# Patient Record
Sex: FEMALE | Race: OTHER | Employment: STUDENT | URBAN - METROPOLITAN AREA
[De-identification: names, ages, dates, MRNs, and addresses within clinical notes are randomized per-mention and may not be internally consistent; named-entity substitution may affect disease eponyms.]

---
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
## 2021-01-15 ENCOUNTER — TELEPHONE (OUTPATIENT)
Dept: FAMILY MEDICINE CLINIC | Facility: CLINIC | Age: 21
End: 2021-01-15

## 2021-01-15 RX ORDER — LEVONORGESTREL AND ETHINYL ESTRADIOL 0.1-0.02MG
1 KIT ORAL DAILY
Qty: 84 TABLET | Refills: 3 | Status: SHIPPED | OUTPATIENT
Start: 2021-01-15 | End: 2021-07-22

## 2021-01-21 ENCOUNTER — OFFICE VISIT (OUTPATIENT)
Dept: FAMILY MEDICINE CLINIC | Facility: CLINIC | Age: 21
End: 2021-01-21
Payer: COMMERCIAL

## 2021-01-21 VITALS
BODY MASS INDEX: 39.54 KG/M2 | HEART RATE: 98 BPM | RESPIRATION RATE: 14 BRPM | SYSTOLIC BLOOD PRESSURE: 120 MMHG | TEMPERATURE: 97 F | WEIGHT: 249 LBS | DIASTOLIC BLOOD PRESSURE: 70 MMHG | HEIGHT: 66.5 IN | OXYGEN SATURATION: 97 %

## 2021-01-21 DIAGNOSIS — G43.709 CHRONIC MIGRAINE WITHOUT AURA WITHOUT STATUS MIGRAINOSUS, NOT INTRACTABLE: ICD-10-CM

## 2021-01-21 PROCEDURE — 99213 OFFICE O/P EST LOW 20 MIN: CPT | Performed by: FAMILY MEDICINE

## 2021-01-21 PROCEDURE — 3008F BODY MASS INDEX DOCD: CPT | Performed by: FAMILY MEDICINE

## 2021-01-21 PROCEDURE — 3074F SYST BP LT 130 MM HG: CPT | Performed by: FAMILY MEDICINE

## 2021-01-21 PROCEDURE — 3078F DIAST BP <80 MM HG: CPT | Performed by: FAMILY MEDICINE

## 2021-01-21 RX ORDER — AMITRIPTYLINE HYDROCHLORIDE 25 MG/1
25 TABLET, FILM COATED ORAL NIGHTLY
Qty: 90 TABLET | Refills: 1 | Status: SHIPPED | OUTPATIENT
Start: 2021-01-21 | End: 2021-07-22

## 2021-01-21 RX ORDER — RIZATRIPTAN BENZOATE 10 MG/1
10 TABLET, ORALLY DISINTEGRATING ORAL AS NEEDED
Qty: 12 TABLET | Refills: 3 | Status: SHIPPED | OUTPATIENT
Start: 2021-01-21 | End: 2021-07-22

## 2021-01-21 NOTE — PROGRESS NOTES
Chief Complaint:  Patient presents with:  Headache: Follow Up on medication, notice relief     HPI:  This is a 24year old female patient presenting for Headache (Follow Up on medication, notice relief )    Migraines: Stopped taking topamax last visit.  Sta Alcohol use: No    Drug use: No       Current Outpatient Medications   Medication Sig Dispense Refill   • Amitriptyline HCl 25 MG Oral Tab Take 1 tablet (25 mg total) by mouth nightly.  90 tablet 1   • Rizatriptan Benzoate 10 MG Oral Tablet Dispersible Nathen Osier Dispersible          Advised the following:  Mita Yepez was seen today for headache. Diagnoses and all orders for this visit:    Chronic migraine without aura without status migrainosus, not intractable  Given improvement, will continue current management.

## 2021-03-03 ENCOUNTER — HOSPITAL ENCOUNTER (OUTPATIENT)
Dept: GENERAL RADIOLOGY | Age: 21
Discharge: HOME | End: 2021-03-03
Attending: NURSE PRACTITIONER
Payer: COMMERCIAL

## 2021-03-03 ENCOUNTER — OFFICE VISIT (OUTPATIENT)
Dept: FAMILY MEDICINE CLINIC | Facility: CLINIC | Age: 21
End: 2021-03-03
Payer: COMMERCIAL

## 2021-03-03 VITALS
RESPIRATION RATE: 16 BRPM | HEIGHT: 68.5 IN | DIASTOLIC BLOOD PRESSURE: 80 MMHG | TEMPERATURE: 98 F | SYSTOLIC BLOOD PRESSURE: 118 MMHG | WEIGHT: 252 LBS | HEART RATE: 90 BPM | BODY MASS INDEX: 37.76 KG/M2

## 2021-03-03 DIAGNOSIS — S69.92XA INJURY OF FINGER OF LEFT HAND, INITIAL ENCOUNTER: ICD-10-CM

## 2021-03-03 DIAGNOSIS — M79.645 PAIN OF FINGER OF LEFT HAND: Primary | ICD-10-CM

## 2021-03-03 DIAGNOSIS — M79.645 PAIN OF FINGER OF LEFT HAND: ICD-10-CM

## 2021-03-03 PROCEDURE — 3074F SYST BP LT 130 MM HG: CPT | Performed by: NURSE PRACTITIONER

## 2021-03-03 PROCEDURE — 73140 X-RAY EXAM OF FINGER(S): CPT | Performed by: NURSE PRACTITIONER

## 2021-03-03 PROCEDURE — 3079F DIAST BP 80-89 MM HG: CPT | Performed by: NURSE PRACTITIONER

## 2021-03-03 PROCEDURE — 99213 OFFICE O/P EST LOW 20 MIN: CPT | Performed by: NURSE PRACTITIONER

## 2021-03-03 PROCEDURE — 3008F BODY MASS INDEX DOCD: CPT | Performed by: NURSE PRACTITIONER

## 2021-03-03 RX ORDER — NAPROXEN 500 MG/1
500 TABLET ORAL 2 TIMES DAILY WITH MEALS
Qty: 10 TABLET | Refills: 0 | Status: SHIPPED | OUTPATIENT
Start: 2021-03-03

## 2021-03-03 NOTE — PROGRESS NOTES
Patient presents with:  Finger Pain: middle finger on left hand       HPI:  Presents with approx 17 day history of left hand 3rd digit pain after she accidentally closed her finger in a car trunk. Stated at time of injury fingertip bled \"a lot\".  Since th before exercise. 1 Inhaler 6   • Fluticasone Propionate 50 MCG/ACT Nasal Suspension 1 spray by Each Nare route as needed for Rhinitis.  3 Bottle 3       Physical Exam  /80   Pulse 90   Temp 97.7 °F (36.5 °C) (Temporal)   Resp 16   Ht 5' 8.5\" (1.74 m) visit. All questions were answered and the patient understands the plan.

## 2021-07-22 NOTE — PROGRESS NOTES
Chief Complaint:  Patient presents with:  Medication Request: 6 month Follow Up     HPI:  This is a 22year old female patient presenting for Medication Request (6 month Follow Up )    Anxiety: Patient has anxiety symptoms are significantly worsened recent Past Surgical History:   Procedure Laterality Date   • OTHER Bilateral 2010    ear surgery to remove earring   • OTHER SURGICAL HISTORY  01/27/2020    excision of inflamed sebaceous cyst      Family History   Problem Relation Age of Onset   • Other (mult 07/22/21  1316   BP: 118/70   Pulse: 80   Resp: 16   Temp: 98.7 °F (37.1 °C)   TempSrc: Temporal   Weight: 249 lb (112.9 kg)   Height: 5' 6.5\" (1.689 m)     GENERAL: vitals reviewed and listed above, alert, oriented, appears well hydrated and in no acute counseling. Side effects discussed in detail.  -     desvenlafaxine succinate ER 25 MG Oral Tablet 24 Hr;  Take 1 tablet (25 mg total) by mouth daily.  -     OP REFERRAL TO [facility] CH[facility]    Chronic migraine without aura without status migrainosus, not intractab

## 2021-10-15 DIAGNOSIS — R05.9 COUGH: ICD-10-CM

## 2021-10-15 DIAGNOSIS — R06.00 DYSPNEA ON EXERTION: ICD-10-CM

## 2021-10-15 DIAGNOSIS — G43.709 CHRONIC MIGRAINE WITHOUT AURA WITHOUT STATUS MIGRAINOSUS, NOT INTRACTABLE: ICD-10-CM

## 2021-10-15 RX ORDER — RIZATRIPTAN BENZOATE 10 MG/1
10 TABLET, ORALLY DISINTEGRATING ORAL AS NEEDED
Qty: 12 TABLET | Refills: 3 | Status: CANCELLED | OUTPATIENT
Start: 2021-10-15

## 2021-10-15 RX ORDER — ALBUTEROL SULFATE 90 UG/1
AEROSOL, METERED RESPIRATORY (INHALATION)
Qty: 18 G | Refills: 1 | Status: CANCELLED | OUTPATIENT
Start: 2021-10-15

## 2021-10-15 RX ORDER — AMITRIPTYLINE HYDROCHLORIDE 25 MG/1
25 TABLET, FILM COATED ORAL NIGHTLY
Qty: 90 TABLET | Refills: 1 | Status: CANCELLED | OUTPATIENT
Start: 2021-10-15

## 2021-10-15 RX ORDER — AMITRIPTYLINE HYDROCHLORIDE 10 MG/1
10 TABLET, FILM COATED ORAL NIGHTLY
Qty: 90 TABLET | Refills: 0 | Status: CANCELLED | OUTPATIENT
Start: 2021-10-15

## 2021-10-26 ENCOUNTER — TELEPHONE (OUTPATIENT)
Dept: FAMILY MEDICINE CLINIC | Facility: CLINIC | Age: 21
End: 2021-10-26

## 2021-10-26 DIAGNOSIS — H57.89 EYE IRRITATION: Primary | ICD-10-CM

## 2021-10-26 NOTE — TELEPHONE ENCOUNTER
Called and patient she has tried OTC meds eye drops and pills denies drainage. Eye lids get itchy red and puffy this happens for several days then when she wakes up the swelling is gone her eye lids have peeling skin.  I will talk to Dr Sherif Fabian and see if sh

## 2021-10-26 NOTE — TELEPHONE ENCOUNTER
Pt calling she has been having issues with her eye it gets scratchy swollen and it hurts off and on since August.  She is wanted a appt or to find out what to do.

## 2021-10-27 NOTE — TELEPHONE ENCOUNTER
Suspect lid eczema. Yes, let's refer to ophthalmology. Please inform of referral info.    [place] Roxana, DO

## 2021-10-27 NOTE — TELEPHONE ENCOUNTER
LMOM for Sadia, letting her know a referral has been place to ophthalmology. Asked to return a call to triage to give her the contact information.

## 2021-12-06 ENCOUNTER — LAB ENCOUNTER (OUTPATIENT)
Dept: LAB | Facility: HOSPITAL | Age: 21
End: 2021-12-06
Attending: FAMILY MEDICINE
Payer: COMMERCIAL

## 2021-12-06 DIAGNOSIS — Z00.00 WELL WOMAN EXAM WITHOUT GYNECOLOGICAL EXAM: ICD-10-CM

## 2021-12-06 DIAGNOSIS — E55.9 VITAMIN D DEFICIENCY: Primary | ICD-10-CM

## 2021-12-06 PROCEDURE — 84443 ASSAY THYROID STIM HORMONE: CPT

## 2021-12-06 PROCEDURE — 82306 VITAMIN D 25 HYDROXY: CPT

## 2021-12-06 PROCEDURE — 80053 COMPREHEN METABOLIC PANEL: CPT

## 2021-12-06 PROCEDURE — 80061 LIPID PANEL: CPT

## 2021-12-06 PROCEDURE — 85027 COMPLETE CBC AUTOMATED: CPT

## 2021-12-06 PROCEDURE — 36415 COLL VENOUS BLD VENIPUNCTURE: CPT

## 2021-12-06 RX ORDER — ERGOCALCIFEROL 1.25 MG/1
50000 CAPSULE ORAL WEEKLY
Qty: 12 CAPSULE | Refills: 0 | Status: SHIPPED | OUTPATIENT
Start: 2021-12-06 | End: 2022-01-10

## 2022-01-10 NOTE — PROGRESS NOTES
Chief Complaint:  Patient presents with:  Asthma: ACT due, albuterol refill  Headache: Amitriptyline refill    HPI:  This is a 22year old female patient presenting for Asthma (ACT due, albuterol refill) and Headache (Amitriptyline refill)    Concerns/upda Hx of breast cancer, Hx of cirrhosis, liver lesion and SLE (antiphospholipid antibody positive)    Health Maintenance:  COVID-19 Vaccine(1) Never done  Chlamydia Screening due on 10/04/2020  Annual Depression Screen due on 11/06/2020  Influenza Vaccine(1) albuterol (PROAIR HFA) 108 (90 Base) MCG/ACT Inhalation Aero Soln Use 1-2 puffs every 4-6 hours or before exercise. 18 g 1   • amitriptyline 10 MG Oral Tab Take 1 tablet (10 mg total) by mouth nightly.  Take in addition to 25mg tablet 90 tablet 1   • Levono MG Oral Tab    rizatriptan 10 MG Oral Tablet Dispersible    amitriptyline 10 MG Oral Tab      Other Visit Diagnoses     Vitamin D deficiency        Relevant Medications    ergocalciferol 1.25 MG (43173 UT) Oral Cap    Cough        Relevant Medications    a anxiety medication.     RTC in 6 months for Min Stephen DO  1/10/2022 3:27 PM  Family Medicine

## 2022-01-21 ENCOUNTER — TELEPHONE (OUTPATIENT)
Dept: FAMILY MEDICINE CLINIC | Facility: CLINIC | Age: 22
End: 2022-01-21

## 2022-01-21 DIAGNOSIS — H02.849 EYELID GLAND SWELLING, UNSPECIFIED LATERALITY: Primary | ICD-10-CM

## 2022-01-21 DIAGNOSIS — J45.20 MILD INTERMITTENT ASTHMA WITHOUT COMPLICATION: ICD-10-CM

## 2022-01-21 DIAGNOSIS — R06.00 DYSPNEA ON EXERTION: ICD-10-CM

## 2022-01-21 NOTE — TELEPHONE ENCOUNTER
From: Charity Lucas  To: Claudean Joiner, DO  Sent: 1/21/2022 2:07 PM CST  Subject: Rosibel Buchanan,  I finally got to getting my prescriptions from [address] [place] [address], and I noticed Pristiq is no longer on my medication list for anxiety.  I don't

## 2022-01-21 NOTE — TELEPHONE ENCOUNTER
Referral request Dr. Lore Ramires M.D. Eyelid swelling  Asthma  Dyspnea on exertion    Office notes from Dr. Alla Flores, DO 1/10/22  -Late summer into fall patient developed eyelid swelling, eye redness and pruritus. Had no clear allergic trigger.   It is ha

## 2022-01-21 NOTE — TELEPHONE ENCOUNTER
Pt is calling requesting a referral for allergist. Pt had visit with Dr. Danisha Diaz 01/10/22 and discussed allergies with Dr. Fidencio Nagy pt has ongoing swelling of eyes from allergies. Please contact patient once referral entered.  Thanks

## 2022-03-09 ENCOUNTER — TELEPHONE (OUTPATIENT)
Dept: FAMILY MEDICINE CLINIC | Facility: CLINIC | Age: 22
End: 2022-03-09

## 2022-03-09 NOTE — TELEPHONE ENCOUNTER
Pt requesting a note for her School as she has been having attendance issues due to her migraines. One of her professor's has brought this up to Administration.  Pt will  when ready

## 2022-03-09 NOTE — TELEPHONE ENCOUNTER
Spoke with patient. She reports worsening of her migraines the past 3-4 weeks. Has been missing 3 days of class per week. Would previously have relief after one tablet of rizatriptan but now she is needing the 2nd tablet and it takes 4-5 hours for relief. The only thing that has been helping is being in a quiet dark room or taking a nap. Professor at school has brought it up to [place]. She would like a letter on file that states her diagnosis and also excuse her from the days she has already missed. Patient is scheduled on 3/22 with Dr. Gulshan Dela Cruz. Would like ASAP if able. Please advise. Patient is not symptomatic now.

## 2022-03-17 ENCOUNTER — TELEMEDICINE (OUTPATIENT)
Dept: FAMILY MEDICINE CLINIC | Facility: CLINIC | Age: 22
End: 2022-03-17

## 2022-03-17 DIAGNOSIS — G43.709 CHRONIC MIGRAINE WITHOUT AURA WITHOUT STATUS MIGRAINOSUS, NOT INTRACTABLE: ICD-10-CM

## 2022-03-17 PROCEDURE — 99214 OFFICE O/P EST MOD 30 MIN: CPT | Performed by: FAMILY MEDICINE

## 2022-03-17 RX ORDER — AMITRIPTYLINE HYDROCHLORIDE 50 MG/1
50 TABLET, FILM COATED ORAL NIGHTLY
Qty: 90 TABLET | Refills: 0 | Status: SHIPPED | OUTPATIENT
Start: 2022-03-17

## 2022-03-17 RX ORDER — BUTALBITAL, ACETAMINOPHEN AND CAFFEINE 300; 40; 50 MG/1; MG/1; MG/1
1 CAPSULE ORAL EVERY 6 HOURS PRN
Qty: 30 CAPSULE | Refills: 1 | Status: SHIPPED | OUTPATIENT
Start: 2022-03-17

## 2022-03-21 RX ORDER — LEVONORGESTREL AND ETHINYL ESTRADIOL 0.1-0.02MG
1 KIT ORAL DAILY
Qty: 84 TABLET | Refills: 5 | Status: SHIPPED | OUTPATIENT
Start: 2022-03-21

## 2022-05-07 ENCOUNTER — HOSPITAL ENCOUNTER (OUTPATIENT)
Age: 22
Discharge: HOME | End: 2022-05-07
Payer: COMMERCIAL

## 2022-05-07 ENCOUNTER — APPOINTMENT (OUTPATIENT)
Dept: GENERAL RADIOLOGY | Age: 22
End: 2022-05-07
Attending: PHYSICIAN ASSISTANT
Payer: COMMERCIAL

## 2022-05-07 VITALS
SYSTOLIC BLOOD PRESSURE: 129 MMHG | TEMPERATURE: 98 F | OXYGEN SATURATION: 98 % | DIASTOLIC BLOOD PRESSURE: 85 MMHG | RESPIRATION RATE: 20 BRPM | HEART RATE: 98 BPM

## 2022-05-07 DIAGNOSIS — M25.571 ACUTE RIGHT ANKLE PAIN: Primary | ICD-10-CM

## 2022-05-07 DIAGNOSIS — M79.671 RIGHT FOOT PAIN: ICD-10-CM

## 2022-05-07 PROCEDURE — 73630 X-RAY EXAM OF FOOT: CPT | Performed by: PHYSICIAN ASSISTANT

## 2022-05-07 PROCEDURE — 73610 X-RAY EXAM OF ANKLE: CPT | Performed by: PHYSICIAN ASSISTANT

## 2022-05-07 PROCEDURE — E0114 CRUTCH UNDERARM PAIR NO WOOD: HCPCS | Performed by: PHYSICIAN ASSISTANT

## 2022-05-07 PROCEDURE — 99213 OFFICE O/P EST LOW 20 MIN: CPT | Performed by: PHYSICIAN ASSISTANT

## 2022-05-07 NOTE — ED INITIAL ASSESSMENT (HPI)
Pt missed the last stair last night and twisted her rt ankle and foot.   Remains painful and swollen

## 2022-05-10 ENCOUNTER — OFFICE VISIT (OUTPATIENT)
Dept: FAMILY MEDICINE CLINIC | Facility: CLINIC | Age: 22
End: 2022-05-10
Payer: COMMERCIAL

## 2022-05-10 VITALS
OXYGEN SATURATION: 99 % | WEIGHT: 252 LBS | BODY MASS INDEX: 39.55 KG/M2 | DIASTOLIC BLOOD PRESSURE: 80 MMHG | HEIGHT: 67 IN | HEART RATE: 96 BPM | SYSTOLIC BLOOD PRESSURE: 120 MMHG | TEMPERATURE: 98 F | RESPIRATION RATE: 16 BRPM

## 2022-05-10 DIAGNOSIS — S93.401A SPRAIN OF RIGHT ANKLE, UNSPECIFIED LIGAMENT, INITIAL ENCOUNTER: Primary | ICD-10-CM

## 2022-05-10 PROCEDURE — 3079F DIAST BP 80-89 MM HG: CPT | Performed by: NURSE PRACTITIONER

## 2022-05-10 PROCEDURE — 3074F SYST BP LT 130 MM HG: CPT | Performed by: NURSE PRACTITIONER

## 2022-05-10 PROCEDURE — 99213 OFFICE O/P EST LOW 20 MIN: CPT | Performed by: NURSE PRACTITIONER

## 2022-05-10 PROCEDURE — 3008F BODY MASS INDEX DOCD: CPT | Performed by: NURSE PRACTITIONER

## 2022-05-10 RX ORDER — NAPROXEN 500 MG/1
500 TABLET ORAL 2 TIMES DAILY WITH MEALS
Qty: 10 TABLET | Refills: 0 | Status: SHIPPED | OUTPATIENT
Start: 2022-05-10

## 2022-05-31 DIAGNOSIS — E55.9 VITAMIN D DEFICIENCY: ICD-10-CM

## 2022-05-31 DIAGNOSIS — G43.709 CHRONIC MIGRAINE WITHOUT AURA WITHOUT STATUS MIGRAINOSUS, NOT INTRACTABLE: ICD-10-CM

## 2022-05-31 DIAGNOSIS — Z30.41 ENCOUNTER FOR SURVEILLANCE OF CONTRACEPTIVE PILLS: ICD-10-CM

## 2022-05-31 NOTE — TELEPHONE ENCOUNTER
Pt calling to ask if refills can be sent over today as her insurance expires today and would like to get one more round of refills before then.  Please send to [facility] on [address]

## 2022-05-31 NOTE — TELEPHONE ENCOUNTER
LOV 05/10/22 sprain ankle Devaughn  previous OV 01/10/22 w/ Elsy  Past due for physical & chlamydia screen  Physical scheduled 7/11/22

## 2022-06-01 RX ORDER — LEVONORGESTREL AND ETHINYL ESTRADIOL 0.1-0.02MG
1 KIT ORAL DAILY
Qty: 84 TABLET | Refills: 5 | Status: SHIPPED | OUTPATIENT
Start: 2022-06-01

## 2022-06-01 RX ORDER — ERGOCALCIFEROL 1.25 MG/1
50000 CAPSULE ORAL WEEKLY
Qty: 12 CAPSULE | Refills: 0 | Status: SHIPPED | OUTPATIENT
Start: 2022-06-01

## 2022-06-01 RX ORDER — BUTALBITAL, ACETAMINOPHEN AND CAFFEINE 300; 40; 50 MG/1; MG/1; MG/1
1 CAPSULE ORAL EVERY 6 HOURS PRN
Qty: 30 CAPSULE | Refills: 1 | Status: SHIPPED | OUTPATIENT
Start: 2022-06-01

## 2022-06-01 RX ORDER — AMITRIPTYLINE HYDROCHLORIDE 50 MG/1
50 TABLET, FILM COATED ORAL NIGHTLY
Qty: 90 TABLET | Refills: 0 | Status: SHIPPED | OUTPATIENT
Start: 2022-06-01

## 2022-08-04 ENCOUNTER — OFFICE VISIT (OUTPATIENT)
Dept: FAMILY MEDICINE CLINIC | Facility: CLINIC | Age: 22
End: 2022-08-04
Payer: COMMERCIAL

## 2022-08-04 VITALS
HEIGHT: 67 IN | WEIGHT: 251 LBS | HEART RATE: 80 BPM | TEMPERATURE: 98 F | RESPIRATION RATE: 16 BRPM | SYSTOLIC BLOOD PRESSURE: 118 MMHG | BODY MASS INDEX: 39.39 KG/M2 | DIASTOLIC BLOOD PRESSURE: 70 MMHG

## 2022-08-04 DIAGNOSIS — R11.0 NAUSEA: ICD-10-CM

## 2022-08-04 DIAGNOSIS — G43.709 CHRONIC MIGRAINE WITHOUT AURA WITHOUT STATUS MIGRAINOSUS, NOT INTRACTABLE: Primary | ICD-10-CM

## 2022-08-04 DIAGNOSIS — R10.84 GENERALIZED POSTPRANDIAL ABDOMINAL PAIN: ICD-10-CM

## 2022-08-04 PROCEDURE — 3008F BODY MASS INDEX DOCD: CPT | Performed by: FAMILY MEDICINE

## 2022-08-04 PROCEDURE — 3078F DIAST BP <80 MM HG: CPT | Performed by: FAMILY MEDICINE

## 2022-08-04 PROCEDURE — 3074F SYST BP LT 130 MM HG: CPT | Performed by: FAMILY MEDICINE

## 2022-08-04 PROCEDURE — 99214 OFFICE O/P EST MOD 30 MIN: CPT | Performed by: FAMILY MEDICINE

## 2022-08-04 RX ORDER — BUTALBITAL, ACETAMINOPHEN AND CAFFEINE 300; 40; 50 MG/1; MG/1; MG/1
1 CAPSULE ORAL EVERY 6 HOURS PRN
Qty: 30 CAPSULE | Refills: 1 | Status: SHIPPED | OUTPATIENT
Start: 2022-08-04

## 2022-08-04 RX ORDER — AMITRIPTYLINE HYDROCHLORIDE 50 MG/1
50 TABLET, FILM COATED ORAL NIGHTLY
Qty: 90 TABLET | Refills: 0 | Status: SHIPPED | OUTPATIENT
Start: 2022-08-04

## 2022-08-05 ENCOUNTER — LAB ENCOUNTER (OUTPATIENT)
Dept: LAB | Facility: HOSPITAL | Age: 22
End: 2022-08-05
Attending: FAMILY MEDICINE
Payer: COMMERCIAL

## 2022-08-05 DIAGNOSIS — R11.0 NAUSEA: ICD-10-CM

## 2022-08-05 DIAGNOSIS — R10.84 GENERALIZED POSTPRANDIAL ABDOMINAL PAIN: ICD-10-CM

## 2022-08-05 LAB — IGA SERPL-MCNC: 171 MG/DL (ref 70–312)

## 2022-08-05 PROCEDURE — 86364 TISS TRNSGLTMNASE EA IG CLAS: CPT

## 2022-08-05 PROCEDURE — 82785 ASSAY OF IGE: CPT

## 2022-08-05 PROCEDURE — 36415 COLL VENOUS BLD VENIPUNCTURE: CPT

## 2022-08-05 PROCEDURE — 86003 ALLG SPEC IGE CRUDE XTRC EA: CPT

## 2022-08-05 PROCEDURE — 82784 ASSAY IGA/IGD/IGG/IGM EACH: CPT

## 2022-08-09 LAB
CLAM IGE QN: <0.1 KUA/L (ref ?–0.1)
CODFISH IGE QN: <0.1 KUA/L (ref ?–0.1)
CORN IGE QN: <0.1 KUA/L (ref ?–0.1)
COW MILK IGE QN: <0.1 KUA/L (ref ?–0.1)
EGG WHITE IGE QN: <0.1 KUA/L (ref ?–0.1)
IGE SERPL-ACNC: 34.3 KU/L (ref 2–214)
PEANUT IGE QN: <0.1 KUA/L (ref ?–0.1)
SCALLOP IGE QN: <0.1 KUA/L (ref ?–0.1)
SESAME SEED IGE QN: <0.1 KUA/L (ref ?–0.1)
SHRIMP IGE QN: <0.1 KUA/L (ref ?–0.1)
SOYBEAN IGE QN: <0.1 KUA/L (ref ?–0.1)
TTG IGA SER-ACNC: 0.7 U/ML (ref ?–7)
WALNUT IGE QN: <0.1 KUA/L (ref ?–0.1)
WHEAT IGE QN: 0.1 KUA/L (ref ?–0.1)

## 2022-08-11 ENCOUNTER — OFFICE VISIT (OUTPATIENT)
Dept: FAMILY MEDICINE CLINIC | Facility: CLINIC | Age: 22
End: 2022-08-11
Payer: COMMERCIAL

## 2022-08-11 ENCOUNTER — PATIENT MESSAGE (OUTPATIENT)
Dept: FAMILY MEDICINE CLINIC | Facility: CLINIC | Age: 22
End: 2022-08-11

## 2022-08-11 VITALS
DIASTOLIC BLOOD PRESSURE: 80 MMHG | RESPIRATION RATE: 18 BRPM | HEART RATE: 84 BPM | TEMPERATURE: 98 F | SYSTOLIC BLOOD PRESSURE: 132 MMHG | OXYGEN SATURATION: 99 %

## 2022-08-11 DIAGNOSIS — J02.9 SORE THROAT: Primary | ICD-10-CM

## 2022-08-11 DIAGNOSIS — J06.9 VIRAL UPPER RESPIRATORY ILLNESS: ICD-10-CM

## 2022-08-11 LAB
CONTROL LINE PRESENT WITH A CLEAR BACKGROUND (YES/NO): YES YES/NO
STREP GRP A CUL-SCR: NEGATIVE

## 2022-08-11 PROCEDURE — 87880 STREP A ASSAY W/OPTIC: CPT | Performed by: NURSE PRACTITIONER

## 2022-08-11 PROCEDURE — 99213 OFFICE O/P EST LOW 20 MIN: CPT | Performed by: NURSE PRACTITIONER

## 2022-08-11 PROCEDURE — 3079F DIAST BP 80-89 MM HG: CPT | Performed by: NURSE PRACTITIONER

## 2022-08-11 PROCEDURE — 3075F SYST BP GE 130 - 139MM HG: CPT | Performed by: NURSE PRACTITIONER

## 2022-08-11 NOTE — TELEPHONE ENCOUNTER
From: Taylor Melgra  To: Skylar Monte DO  Sent: 8/11/2022 1:28 PM CDT  Subject: Question regarding TISSUE TRANSGLUTAMINASE AB IGA    What do these results mean?

## 2022-08-12 LAB — SARS-COV-2 RNA RESP QL NAA+PROBE: NOT DETECTED

## 2022-09-13 ENCOUNTER — TELEPHONE (OUTPATIENT)
Dept: FAMILY MEDICINE CLINIC | Facility: CLINIC | Age: 22
End: 2022-09-13

## 2022-09-13 NOTE — TELEPHONE ENCOUNTER
Can we get details on how frequent she is getting debilitating migraines and how much time she needs to take off from work or school if she has one?  (Hours, day, more than 1 day)    Thanks,  Iris Willis, DO

## 2022-09-13 NOTE — TELEPHONE ENCOUNTER
Pt is requesting letter for employer stating she suffers from migraines and limitations are limited at work and school when she does get one. Pt would letter uploaded to My Chart.

## 2022-09-13 NOTE — TELEPHONE ENCOUNTER
How frequent they are daily, lasts all day but can last longer if we write a letter could need all day off taking fioricet for the headaches some times it does not work

## 2022-09-14 NOTE — TELEPHONE ENCOUNTER
Sorry- can we clarify how often the fioricet is not working? As in how often fidelia is needing to take time off of work? Also, does she have an appointment with neurology? Please let me know if you have any questions.   [facility], DO 9/13/2022 10:23 PM

## 2022-09-14 NOTE — TELEPHONE ENCOUNTER
Called and talked to patient she said the Fioricet is working about 60% of the time. How often does she need to take off work. She took off last week she usually never takes off.  Got disconnected called and asked her to call back

## 2022-12-02 ENCOUNTER — OFFICE VISIT (OUTPATIENT)
Dept: FAMILY MEDICINE CLINIC | Facility: CLINIC | Age: 22
End: 2022-12-02
Payer: COMMERCIAL

## 2022-12-02 VITALS
RESPIRATION RATE: 18 BRPM | OXYGEN SATURATION: 97 % | SYSTOLIC BLOOD PRESSURE: 120 MMHG | BODY MASS INDEX: 38.57 KG/M2 | TEMPERATURE: 97 F | HEART RATE: 118 BPM | HEIGHT: 66 IN | WEIGHT: 240 LBS | DIASTOLIC BLOOD PRESSURE: 72 MMHG

## 2022-12-02 DIAGNOSIS — J02.9 SORE THROAT: ICD-10-CM

## 2022-12-02 DIAGNOSIS — R68.89 FLU-LIKE SYMPTOMS: Primary | ICD-10-CM

## 2022-12-02 LAB
CONTROL LINE PRESENT WITH A CLEAR BACKGROUND (YES/NO): YES YES/NO
KIT LOT #: NORMAL NUMERIC
STREP GRP A CUL-SCR: NEGATIVE

## 2022-12-02 PROCEDURE — 87880 STREP A ASSAY W/OPTIC: CPT | Performed by: FAMILY MEDICINE

## 2022-12-02 PROCEDURE — 3074F SYST BP LT 130 MM HG: CPT | Performed by: FAMILY MEDICINE

## 2022-12-02 PROCEDURE — 3008F BODY MASS INDEX DOCD: CPT | Performed by: FAMILY MEDICINE

## 2022-12-02 PROCEDURE — 99213 OFFICE O/P EST LOW 20 MIN: CPT | Performed by: FAMILY MEDICINE

## 2022-12-02 PROCEDURE — 87637 SARSCOV2&INF A&B&RSV AMP PRB: CPT | Performed by: FAMILY MEDICINE

## 2022-12-02 PROCEDURE — 3078F DIAST BP <80 MM HG: CPT | Performed by: FAMILY MEDICINE

## 2022-12-02 RX ORDER — ALBUTEROL SULFATE 90 UG/1
AEROSOL, METERED RESPIRATORY (INHALATION)
Qty: 1 EACH | Refills: 0 | Status: SHIPPED | OUTPATIENT
Start: 2022-12-02

## 2022-12-02 RX ORDER — OSELTAMIVIR PHOSPHATE 75 MG/1
75 CAPSULE ORAL 2 TIMES DAILY
Qty: 10 CAPSULE | Refills: 0 | Status: SHIPPED | OUTPATIENT
Start: 2022-12-02 | End: 2022-12-07

## 2022-12-02 RX ORDER — BENZONATATE 200 MG/1
200 CAPSULE ORAL 3 TIMES DAILY PRN
Qty: 30 CAPSULE | Refills: 0 | Status: SHIPPED | OUTPATIENT
Start: 2022-12-02

## 2022-12-03 LAB
FLUAV + FLUBV RNA SPEC NAA+PROBE: DETECTED
FLUAV + FLUBV RNA SPEC NAA+PROBE: NOT DETECTED
RSV RNA SPEC NAA+PROBE: NOT DETECTED
SARS-COV-2 RNA RESP QL NAA+PROBE: NOT DETECTED

## 2023-01-10 ENCOUNTER — TELEPHONE (OUTPATIENT)
Dept: FAMILY MEDICINE CLINIC | Facility: CLINIC | Age: 23
End: 2023-01-10

## 2023-01-10 NOTE — TELEPHONE ENCOUNTER
Pt is calling and would like to get a full hormone panel and cortisol level blood test ordered.  Hasnt been feeling the best after eating and

## 2023-01-10 NOTE — TELEPHONE ENCOUNTER
Routed to front staff     Please call pt to schedule appt to discuss concerns and review need for requested labs

## 2023-01-11 NOTE — TELEPHONE ENCOUNTER
LM for patient to schedule an appointment with Dr. Juan J Palmer and discuss the need for requested labs and review her concerns.

## 2023-01-17 ENCOUNTER — OFFICE VISIT (OUTPATIENT)
Dept: FAMILY MEDICINE CLINIC | Facility: CLINIC | Age: 23
End: 2023-01-17
Payer: COMMERCIAL

## 2023-01-17 VITALS
HEART RATE: 74 BPM | RESPIRATION RATE: 16 BRPM | SYSTOLIC BLOOD PRESSURE: 124 MMHG | DIASTOLIC BLOOD PRESSURE: 80 MMHG | WEIGHT: 256 LBS | HEIGHT: 67 IN | BODY MASS INDEX: 40.18 KG/M2

## 2023-01-17 DIAGNOSIS — Z00.00 ROUTINE HEALTH MAINTENANCE: ICD-10-CM

## 2023-01-17 DIAGNOSIS — R11.10 VOMITING AND DIARRHEA: Primary | ICD-10-CM

## 2023-01-17 DIAGNOSIS — R68.89 COLD INTOLERANCE: ICD-10-CM

## 2023-01-17 DIAGNOSIS — R23.3 EASY BRUISING: ICD-10-CM

## 2023-01-17 DIAGNOSIS — R19.7 VOMITING AND DIARRHEA: Primary | ICD-10-CM

## 2023-01-17 PROCEDURE — 3074F SYST BP LT 130 MM HG: CPT | Performed by: FAMILY MEDICINE

## 2023-01-17 PROCEDURE — 3008F BODY MASS INDEX DOCD: CPT | Performed by: FAMILY MEDICINE

## 2023-01-17 PROCEDURE — 3079F DIAST BP 80-89 MM HG: CPT | Performed by: FAMILY MEDICINE

## 2023-01-17 PROCEDURE — 99214 OFFICE O/P EST MOD 30 MIN: CPT | Performed by: FAMILY MEDICINE

## 2023-01-28 ENCOUNTER — LAB ENCOUNTER (OUTPATIENT)
Dept: LAB | Facility: HOSPITAL | Age: 23
End: 2023-01-28
Attending: FAMILY MEDICINE
Payer: COMMERCIAL

## 2023-01-28 DIAGNOSIS — R23.3 EASY BRUISING: ICD-10-CM

## 2023-01-28 DIAGNOSIS — R68.89 COLD INTOLERANCE: ICD-10-CM

## 2023-01-28 DIAGNOSIS — Z00.00 ROUTINE HEALTH MAINTENANCE: ICD-10-CM

## 2023-01-28 LAB
ALBUMIN SERPL-MCNC: 3.5 G/DL (ref 3.4–5)
ALBUMIN/GLOB SERPL: 1 {RATIO} (ref 1–2)
ALP LIVER SERPL-CCNC: 88 U/L
ALT SERPL-CCNC: 21 U/L
ANION GAP SERPL CALC-SCNC: 5 MMOL/L (ref 0–18)
AST SERPL-CCNC: 17 U/L (ref 15–37)
BASOPHILS # BLD AUTO: 0.04 X10(3) UL (ref 0–0.2)
BASOPHILS NFR BLD AUTO: 0.5 %
BILIRUB SERPL-MCNC: 1.4 MG/DL (ref 0.1–2)
BUN BLD-MCNC: 12 MG/DL (ref 7–18)
CALCIUM BLD-MCNC: 9.5 MG/DL (ref 8.5–10.1)
CHLORIDE SERPL-SCNC: 110 MMOL/L (ref 98–112)
CHOLEST SERPL-MCNC: 179 MG/DL (ref ?–200)
CO2 SERPL-SCNC: 22 MMOL/L (ref 21–32)
CREAT BLD-MCNC: 0.92 MG/DL
DEPRECATED HBV CORE AB SER IA-ACNC: 71.6 NG/ML
EOSINOPHIL # BLD AUTO: 0.11 X10(3) UL (ref 0–0.7)
EOSINOPHIL NFR BLD AUTO: 1.3 %
ERYTHROCYTE [DISTWIDTH] IN BLOOD BY AUTOMATED COUNT: 13.5 %
FASTING PATIENT LIPID ANSWER: YES
FASTING STATUS PATIENT QL REPORTED: YES
GFR SERPLBLD BASED ON 1.73 SQ M-ARVRAT: 91 ML/MIN/1.73M2 (ref 60–?)
GLOBULIN PLAS-MCNC: 3.5 G/DL (ref 2.8–4.4)
GLUCOSE BLD-MCNC: 97 MG/DL (ref 70–99)
HCT VFR BLD AUTO: 39 %
HDLC SERPL-MCNC: 58 MG/DL (ref 40–59)
HGB BLD-MCNC: 13.5 G/DL
IMM GRANULOCYTES # BLD AUTO: 0.02 X10(3) UL (ref 0–1)
IMM GRANULOCYTES NFR BLD: 0.2 %
LDLC SERPL CALC-MCNC: 112 MG/DL (ref ?–100)
LYMPHOCYTES # BLD AUTO: 2.32 X10(3) UL (ref 1–4)
LYMPHOCYTES NFR BLD AUTO: 26.9 %
MCH RBC QN AUTO: 31.5 PG (ref 26–34)
MCHC RBC AUTO-ENTMCNC: 34.6 G/DL (ref 31–37)
MCV RBC AUTO: 90.9 FL
MONOCYTES # BLD AUTO: 0.59 X10(3) UL (ref 0.1–1)
MONOCYTES NFR BLD AUTO: 6.8 %
NEUTROPHILS # BLD AUTO: 5.55 X10 (3) UL (ref 1.5–7.7)
NEUTROPHILS # BLD AUTO: 5.55 X10(3) UL (ref 1.5–7.7)
NEUTROPHILS NFR BLD AUTO: 64.3 %
NONHDLC SERPL-MCNC: 121 MG/DL (ref ?–130)
OSMOLALITY SERPL CALC.SUM OF ELEC: 284 MOSM/KG (ref 275–295)
PLATELET # BLD AUTO: 244 10(3)UL (ref 150–450)
POTASSIUM SERPL-SCNC: 3.8 MMOL/L (ref 3.5–5.1)
PROT SERPL-MCNC: 7 G/DL (ref 6.4–8.2)
RBC # BLD AUTO: 4.29 X10(6)UL
SODIUM SERPL-SCNC: 137 MMOL/L (ref 136–145)
T4 FREE SERPL-MCNC: 1.1 NG/DL (ref 0.8–1.7)
TRIGL SERPL-MCNC: 43 MG/DL (ref 30–149)
TSI SER-ACNC: 4.36 MIU/ML (ref 0.36–3.74)
VLDLC SERPL CALC-MCNC: 7 MG/DL (ref 0–30)
WBC # BLD AUTO: 8.6 X10(3) UL (ref 4–11)

## 2023-01-28 PROCEDURE — 85025 COMPLETE CBC W/AUTO DIFF WBC: CPT

## 2023-01-28 PROCEDURE — 36415 COLL VENOUS BLD VENIPUNCTURE: CPT

## 2023-01-28 PROCEDURE — 80061 LIPID PANEL: CPT

## 2023-01-28 PROCEDURE — 84443 ASSAY THYROID STIM HORMONE: CPT

## 2023-01-28 PROCEDURE — 84439 ASSAY OF FREE THYROXINE: CPT

## 2023-01-28 PROCEDURE — 82728 ASSAY OF FERRITIN: CPT

## 2023-01-28 PROCEDURE — 80053 COMPREHEN METABOLIC PANEL: CPT

## 2023-02-11 ENCOUNTER — OFFICE VISIT (OUTPATIENT)
Dept: FAMILY MEDICINE CLINIC | Facility: CLINIC | Age: 23
End: 2023-02-11
Payer: COMMERCIAL

## 2023-02-11 VITALS
RESPIRATION RATE: 18 BRPM | HEART RATE: 78 BPM | BODY MASS INDEX: 38.57 KG/M2 | OXYGEN SATURATION: 99 % | SYSTOLIC BLOOD PRESSURE: 126 MMHG | DIASTOLIC BLOOD PRESSURE: 64 MMHG | WEIGHT: 240 LBS | TEMPERATURE: 97 F | HEIGHT: 66 IN

## 2023-02-11 DIAGNOSIS — T78.40XA ALLERGIC REACTION, INITIAL ENCOUNTER: Primary | ICD-10-CM

## 2023-02-11 PROCEDURE — 3078F DIAST BP <80 MM HG: CPT | Performed by: PHYSICIAN ASSISTANT

## 2023-02-11 PROCEDURE — 3074F SYST BP LT 130 MM HG: CPT | Performed by: PHYSICIAN ASSISTANT

## 2023-02-11 PROCEDURE — 3008F BODY MASS INDEX DOCD: CPT | Performed by: PHYSICIAN ASSISTANT

## 2023-02-11 PROCEDURE — 99213 OFFICE O/P EST LOW 20 MIN: CPT | Performed by: PHYSICIAN ASSISTANT

## 2023-02-11 RX ORDER — PREDNISONE 10 MG/1
TABLET ORAL
Qty: 20 TABLET | Refills: 0 | Status: SHIPPED | OUTPATIENT
Start: 2023-02-11

## 2023-02-18 PROBLEM — F90.2 ATTENTION DEFICIT HYPERACTIVITY DISORDER (ADHD), COMBINED TYPE: Status: ACTIVE | Noted: 2023-02-18

## 2023-02-21 ENCOUNTER — TELEPHONE (OUTPATIENT)
Dept: FAMILY MEDICINE CLINIC | Facility: CLINIC | Age: 23
End: 2023-02-21

## 2023-02-21 ENCOUNTER — OFFICE VISIT (OUTPATIENT)
Dept: FAMILY MEDICINE CLINIC | Facility: CLINIC | Age: 23
End: 2023-02-21
Payer: COMMERCIAL

## 2023-02-21 VITALS
HEIGHT: 67 IN | WEIGHT: 247 LBS | SYSTOLIC BLOOD PRESSURE: 120 MMHG | RESPIRATION RATE: 16 BRPM | HEART RATE: 80 BPM | TEMPERATURE: 97 F | DIASTOLIC BLOOD PRESSURE: 70 MMHG | OXYGEN SATURATION: 98 % | BODY MASS INDEX: 38.77 KG/M2

## 2023-02-21 DIAGNOSIS — G43.709 CHRONIC MIGRAINE WITHOUT AURA WITHOUT STATUS MIGRAINOSUS, NOT INTRACTABLE: ICD-10-CM

## 2023-02-21 DIAGNOSIS — J30.89 NON-SEASONAL ALLERGIC RHINITIS, UNSPECIFIED TRIGGER: ICD-10-CM

## 2023-02-21 DIAGNOSIS — Z30.41 ENCOUNTER FOR SURVEILLANCE OF CONTRACEPTIVE PILLS: ICD-10-CM

## 2023-02-21 DIAGNOSIS — H01.119 ALLERGIC BLEPHARITIS, UNSPECIFIED LATERALITY: ICD-10-CM

## 2023-02-21 DIAGNOSIS — E03.9 HYPOTHYROIDISM, UNSPECIFIED TYPE: Primary | ICD-10-CM

## 2023-02-21 DIAGNOSIS — R05.9 COUGH: ICD-10-CM

## 2023-02-21 DIAGNOSIS — R06.09 DYSPNEA ON EXERTION: ICD-10-CM

## 2023-02-21 PROCEDURE — 3078F DIAST BP <80 MM HG: CPT | Performed by: FAMILY MEDICINE

## 2023-02-21 PROCEDURE — 99214 OFFICE O/P EST MOD 30 MIN: CPT | Performed by: FAMILY MEDICINE

## 2023-02-21 PROCEDURE — 3074F SYST BP LT 130 MM HG: CPT | Performed by: FAMILY MEDICINE

## 2023-02-21 PROCEDURE — 3008F BODY MASS INDEX DOCD: CPT | Performed by: FAMILY MEDICINE

## 2023-02-21 RX ORDER — AMITRIPTYLINE HYDROCHLORIDE 50 MG/1
50 TABLET, FILM COATED ORAL NIGHTLY
Qty: 90 TABLET | Refills: 0 | Status: SHIPPED | OUTPATIENT
Start: 2023-02-21

## 2023-02-21 RX ORDER — BUTALBITAL, ACETAMINOPHEN AND CAFFEINE 300; 40; 50 MG/1; MG/1; MG/1
1 CAPSULE ORAL EVERY 6 HOURS PRN
Qty: 30 CAPSULE | Refills: 1 | Status: SHIPPED | OUTPATIENT
Start: 2023-02-21

## 2023-02-21 RX ORDER — LEVOTHYROXINE SODIUM 0.03 MG/1
25 TABLET ORAL
Qty: 90 TABLET | Refills: 0 | Status: SHIPPED | OUTPATIENT
Start: 2023-02-21

## 2023-02-21 RX ORDER — LORATADINE 10 MG/1
10 TABLET ORAL DAILY
Qty: 30 TABLET | Refills: 5 | Status: SHIPPED | OUTPATIENT
Start: 2023-02-21

## 2023-02-21 RX ORDER — ALBUTEROL SULFATE 90 UG/1
AEROSOL, METERED RESPIRATORY (INHALATION)
Qty: 18 G | Refills: 1 | Status: SHIPPED | OUTPATIENT
Start: 2023-02-21

## 2023-02-21 RX ORDER — FLUTICASONE PROPIONATE 50 MCG
1 SPRAY, SUSPENSION (ML) NASAL AS NEEDED
Qty: 18.2 ML | Refills: 3 | Status: SHIPPED | OUTPATIENT
Start: 2023-02-21 | End: 2023-02-22

## 2023-02-21 RX ORDER — LEVONORGESTREL AND ETHINYL ESTRADIOL 0.1-0.02MG
1 KIT ORAL DAILY
Qty: 84 TABLET | Refills: 5 | Status: SHIPPED | OUTPATIENT
Start: 2023-02-21

## 2023-02-21 NOTE — TELEPHONE ENCOUNTER
Tracey called and stated in order for insurance to cover fluticasone propionate 50 MCG/ACT Nasal Suspension there has to be a numerical frequency on how many times a day she takes it.  The prescription can not state as needed

## 2023-02-22 RX ORDER — FLUTICASONE PROPIONATE 50 MCG
1 SPRAY, SUSPENSION (ML) NASAL 2 TIMES DAILY
Qty: 18.2 ML | Refills: 3 | Status: SHIPPED | OUTPATIENT
Start: 2023-02-22

## 2023-06-27 DIAGNOSIS — E03.9 HYPOTHYROIDISM, UNSPECIFIED TYPE: ICD-10-CM

## 2023-07-03 RX ORDER — LEVOTHYROXINE SODIUM 0.03 MG/1
TABLET ORAL
Qty: 90 TABLET | Refills: 0 | OUTPATIENT
Start: 2023-07-03

## 2023-09-20 PROBLEM — F39 MOOD DISORDER (HCC): Status: ACTIVE | Noted: 2023-09-20

## 2023-09-20 PROBLEM — G47.10 HYPERSOMNIA: Status: ACTIVE | Noted: 2023-09-20

## 2023-09-20 PROBLEM — F39 MOOD DISORDER: Status: ACTIVE | Noted: 2023-09-20

## 2023-10-16 DIAGNOSIS — Z30.41 ENCOUNTER FOR SURVEILLANCE OF CONTRACEPTIVE PILLS: ICD-10-CM

## 2023-10-17 RX ORDER — TIMOLOL MALEATE 5 MG/ML
1 SOLUTION/ DROPS OPHTHALMIC DAILY
Qty: 84 TABLET | Refills: 5 | Status: SHIPPED | OUTPATIENT
Start: 2023-10-17

## 2023-10-17 NOTE — TELEPHONE ENCOUNTER
Requested Prescriptions     Signed Prescriptions Disp Refills    VIENVA 0.1-20 MG-MCG Oral Tab 84 tablet 5     Sig: TAKE 1 TABLET BY MOUTH DAILY. SKIP PLACEBO     Authorizing Provider: GEOVANNY GAUTHIER     Ordering User: DANIELITO PRASAD 2/21/2023     Patient was asked to follow-up in: 3 months    Appointment scheduled: Visit date not found     Medication refilled per protocol.

## 2023-11-06 ENCOUNTER — TELEPHONE (OUTPATIENT)
Dept: FAMILY MEDICINE CLINIC | Facility: CLINIC | Age: 23
End: 2023-11-06

## 2023-11-06 ENCOUNTER — LAB ENCOUNTER (OUTPATIENT)
Dept: LAB | Facility: HOSPITAL | Age: 23
End: 2023-11-06
Attending: FAMILY MEDICINE
Payer: COMMERCIAL

## 2023-11-06 DIAGNOSIS — E03.9 HYPOTHYROIDISM, UNSPECIFIED TYPE: ICD-10-CM

## 2023-11-06 LAB
THYROGLOB SERPL-MCNC: <15 U/ML (ref ?–60)
THYROPEROXIDASE AB SERPL-ACNC: 38 U/ML (ref ?–60)
TSI SER-ACNC: 2.29 MIU/ML (ref 0.36–3.74)

## 2023-11-06 PROCEDURE — 36415 COLL VENOUS BLD VENIPUNCTURE: CPT

## 2023-11-06 PROCEDURE — 86376 MICROSOMAL ANTIBODY EACH: CPT

## 2023-11-06 PROCEDURE — 84443 ASSAY THYROID STIM HORMONE: CPT

## 2023-11-06 PROCEDURE — 86800 THYROGLOBULIN ANTIBODY: CPT

## 2023-11-06 NOTE — TELEPHONE ENCOUNTER
Left message stating okay to take med before labs- should be taken first thing in morning on empty stomach

## 2024-01-03 DIAGNOSIS — E03.9 HYPOTHYROIDISM, UNSPECIFIED TYPE: ICD-10-CM

## 2024-01-05 RX ORDER — LEVOTHYROXINE SODIUM 0.03 MG/1
TABLET ORAL
Qty: 90 TABLET | Refills: 0 | Status: SHIPPED | OUTPATIENT
Start: 2024-01-05

## 2024-01-05 NOTE — TELEPHONE ENCOUNTER
Requested Prescriptions     Pending Prescriptions Disp Refills    LEVOTHYROXINE 25 MCG Oral Tab [Pharmacy Med Name: LEVOTHYROXINE 0.025MG (25MCG) TAB] 90 tablet 0     Sig: TAKE 1 TABLET(25 MCG) BY MOUTH BEFORE BREAKFAST     LOV 2/21/2023     Patient was asked to follow-up in: 3 months    Appointment scheduled: 3/4/2024 Adela Chin, DO     Medication refilled per protocol.

## 2024-03-04 ENCOUNTER — OFFICE VISIT (OUTPATIENT)
Dept: FAMILY MEDICINE CLINIC | Facility: CLINIC | Age: 24
End: 2024-03-04
Payer: COMMERCIAL

## 2024-03-04 VITALS
HEART RATE: 98 BPM | SYSTOLIC BLOOD PRESSURE: 110 MMHG | OXYGEN SATURATION: 100 % | WEIGHT: 226 LBS | BODY MASS INDEX: 36.32 KG/M2 | HEIGHT: 66 IN | DIASTOLIC BLOOD PRESSURE: 70 MMHG | TEMPERATURE: 98 F

## 2024-03-04 DIAGNOSIS — Z11.51 SCREENING FOR HUMAN PAPILLOMAVIRUS (HPV): ICD-10-CM

## 2024-03-04 DIAGNOSIS — E55.9 VITAMIN D DEFICIENCY: ICD-10-CM

## 2024-03-04 DIAGNOSIS — Z12.4 SCREENING FOR CERVICAL CANCER: ICD-10-CM

## 2024-03-04 DIAGNOSIS — E03.9 HYPOTHYROIDISM, UNSPECIFIED TYPE: ICD-10-CM

## 2024-03-04 DIAGNOSIS — Z01.419 WELL WOMAN EXAM WITH ROUTINE GYNECOLOGICAL EXAM: Primary | ICD-10-CM

## 2024-03-04 DIAGNOSIS — F39 MOOD DISORDER (HCC): ICD-10-CM

## 2024-03-04 PROCEDURE — 87624 HPV HI-RISK TYP POOLED RSLT: CPT | Performed by: FAMILY MEDICINE

## 2024-03-04 PROCEDURE — 88175 CYTOPATH C/V AUTO FLUID REDO: CPT | Performed by: FAMILY MEDICINE

## 2024-03-04 RX ORDER — SULFACETAMIDE SODIUM 100 MG/ML
LOTION TOPICAL
COMMUNITY
Start: 2023-09-20

## 2024-03-04 RX ORDER — QUETIAPINE FUMARATE 100 MG/1
100 TABLET, FILM COATED ORAL NIGHTLY
Qty: 90 TABLET | Refills: 0 | Status: SHIPPED | OUTPATIENT
Start: 2024-03-04

## 2024-03-04 NOTE — PROGRESS NOTES
SUBJECTIVE:  Chief Complaint   Patient presents with    Physical     Physical, trouble sleeping, stopped medications helped some but not completely, when on meds was sleeping too much now waking up     HPI:  Notes that she was struggling to wake in the morning. Still having some trouble sleeping but much better in the morning. Stopped amitriptyline, seroquel, hydroxyzine. Now able to get up better in the morning. Struggling with mood since stopping medications. Has had more anxiety in the last month. Unsure of the trigger (no meds, environment possibly). Thinks she may see another psychiatrist.     Feeling better since going gluten-free. No longer have stomach issues.      Hypothyroidism: On levothyroxine. Denies hairs or nail changes, heat and cold intolerance, weight change, palpitations, diarrhea or constipation     Migraines: Stopped amitriptyline as above. Taking fioricet or excedrin for migraines. Did have one that lasted a week recently but otherwise no increase in frequency.     Health Maintenance:  Vaccines: reviewed as below. Indicated today:Tdap  Immunization History   Administered Date(s) Administered    Covid-19 Vaccine Moderna 100 mcg/0.5 ml 01/10/2022    Covid-19 Vaccine Pfizer 30 mcg/0.3 ml 05/18/2021, 06/08/2021    DTAP INFANRIX 08/27/2001, 10/22/2001, 01/03/2002, 10/03/2002, 09/30/2005    HEP A,Ped/Adol,(2 Dose) 09/13/2011, 07/21/2012    HEP B, Ped/Adol 08/27/2001, 10/22/2001, 03/26/2002    HPV (Gardasil) 11/11/2014, 03/14/2015, 06/29/2015    Hib, Unspecified Formulation 08/27/2001, 10/22/2001, 01/03/2002, 10/03/2002    IPV 08/27/2001, 10/22/2001, 10/03/2002, 09/30/2005    Influenza 09/13/2011    MMR 10/03/2002, 07/19/2006    Meningococcal-Menactra 07/21/2012, 02/23/2018    Pneumococcal (Prevnar 7) 08/27/2001, 10/22/2001, 01/03/2002    TDAP 07/21/2012    Varicella 10/03/2002, 05/06/2008     Obesity screening: Body mass index is 36.48 kg/m².  Diabetes screening:  due  Hypercholesterolemia  screening: due  Lab Results   Component Value Date    HDL 58 01/28/2023    HDL 59 12/06/2021    HDL 43 (L) 10/05/2011     Lab Results   Component Value Date     (H) 01/28/2023     (H) 12/06/2021    LDL 78 10/05/2011     Lab Results   Component Value Date    TRIG 43 01/28/2023    TRIG 76 12/06/2021    TRIG 94 10/05/2011        Depression screen: See above  Osteoporosis: No history of pathologic fractures. No steroid use, smoking, risk of falls, excessive alcohol use.  Cervical Cancer screening: Last Pap: never  Colon Cancer screening: Family history of colon cancer? no   Breast Cancer screening: Family history of breast cancer? no  STI: Desires testing for STIs? no  Tobacco use:  reports that she has never smoked. She has never used smokeless tobacco.    ROS: Negative unless stated above    HISTORY:  Past Medical History:   Diagnosis Date    Anxiety state     Asthma (HCC)     exercise induced asthma    Environmental allergies     Hx of concussion     Migraines     Visual impairment     glasses      Past Surgical History:   Procedure Laterality Date    OTHER Bilateral 2010    ear surgery to remove earring    OTHER SURGICAL HISTORY  01/27/2020    excision of inflamed sebaceous cyst      Family History   Problem Relation Age of Onset    Alcohol and Other Disorders Associated Mother     Personality Disorder Mother     Other (multiple sclerosis) Mother     Cancer Maternal Grandfather         bladder    Diabetes Maternal Grandfather     Diabetes Paternal Grandfather     Bipolar Disorder Paternal Grandfather     Alcohol and Other Disorders Associated Paternal Grandfather     Bipolar Disorder Father     Alcohol and Other Disorders Associated Father     Personality Disorder Father     Bipolar Disorder Half-Sister     Alcohol and Other Disorders Associated Paternal Uncle     Bipolar Disorder Paternal Uncle     Bipolar Disorder Paternal Uncle     Alcohol and Other Disorders Associated Paternal Uncle     Bipolar  Disorder Paternal Uncle     Alcohol and Other Disorders Associated Paternal Uncle       Social History     Socioeconomic History    Marital status: Single   Occupational History    Occupation: Student   Tobacco Use    Smoking status: Never    Smokeless tobacco: Never   Vaping Use    Vaping Use: Never used   Substance and Sexual Activity    Alcohol use: No    Drug use: No   Other Topics Concern    Caffeine Concern Yes     Comment: one cup coffee twice weekly    Exercise Yes     Comment: 3 days a week    Seat Belt Yes    Self-Exams No        Allergies:  Allergies   Allergen Reactions    Penicillins DIZZINESS, JITTERY and HIVES     Blurred Vision    Seasonal Runny nose       OBJECTIVE:  PHYSICAL EXAM:  Vitals:    03/04/24 1032   BP: 110/70   BP Location: Left arm   Patient Position: Sitting   Cuff Size: large   Pulse: 98   Temp: 98.1 °F (36.7 °C)   TempSrc: Oral   SpO2: 100%   Weight: 226 lb (102.5 kg)   Height: 5' 6\" (1.676 m)     Physical Examination: General appearance - alert, well appearing, and in no distress and overweight  Mental status - alert, oriented to person, place, and time, normal mood, behavior, speech, dress, motor activity, and thought processes  Ears - bilateral TM's and external ear canals normal  Neck - supple, no significant adenopathy  Chest - clear to auscultation, no wheezes, rales or rhonchi, symmetric air entry  Heart - normal rate, regular rhythm, normal S1, S2, no murmurs, rubs, clicks or gallops  Abdomen - soft, nontender, nondistended, no masses or organomegaly  Pelvic - normal external genitalia, vulva, vagina, cervix, uterus and adnexa  Extremities - peripheral pulses normal, no pedal edema, no clubbing or cyanosis    ASSESSMENT & PLAN:  Sadia Dan is a 23 year old female is here for Physical (Physical, trouble sleeping, stopped medications helped some but not completely, when on meds was sleeping too much now waking up)    Problem List Items Addressed This Visit          HCC  Problems    Mood disorder (HCC)    Relevant Medications    QUEtiapine (SEROQUEL) 100 MG Oral Tab     Other Visit Diagnoses       Well woman exam with routine gynecological exam    -  Primary    Relevant Orders    CBC With Differential With Platelet    Comp Metabolic Panel (14)    Lipid Panel    TSH W Reflex To Free T4    Vitamin D deficiency        Relevant Orders    Vitamin D [E]    Hypothyroidism, unspecified type        Relevant Orders    TSH W Reflex To Free T4    Screening for cervical cancer        Relevant Orders    ThinPrep PAP Smear    Screening for human papillomavirus (HPV)        Relevant Orders    ThinPrep PAP Smear    Hpv Dna  High Risk , Thin Prep Collect            Sadia was seen today for physical.    Diagnoses and all orders for this visit:    Well woman exam with routine gynecological exam  Routine health maintenance reviewed, discussed and updated as below. This includes: labs, pap. Healthy diet and exercise reviewed.   -     CBC With Differential With Platelet; Future  -     Comp Metabolic Panel (14); Future  -     Lipid Panel; Future  -     TSH W Reflex To Free T4; Future    Vitamin D deficiency  -     Vitamin D [E]; Future    Hypothyroidism, unspecified type  -    Cont levothyroxine TSH W Reflex To Free T4; Future    Mood disorder (HCC)  -     Suspect daytime somnolence from medication combination. Will restart QUEtiapine (SEROQUEL) at lower dose of 100 MG Oral Tab; Take 1 tablet (100 mg total) by mouth nightly. Hold hydroxyzine and amitriptyline    Screening for cervical cancer  -     ThinPrep PAP Smear; Future  -     ThinPrep PAP Smear    Screening for human papillomavirus (HPV)  -     ThinPrep PAP Smear; Future  -     Hpv Dna  High Risk , Thin Prep Collect; Future  -     ThinPrep PAP Smear  -     Hpv Dna  High Risk , Thin Prep Collect    Call or return to clinic prn if these symptoms worsen or fail to improve as anticipated. RTC in 1 year or sooner if unable to get into psychiatry,  migraines worsen.   Adela Chin   3/4/2024 10:49 AM    Note to Patient  The 21st Century Cures Act makes medical notes like these available to patients in the interest of transparency. However, be advised this is a medical document and is intended as blta-bl-wqlw communication; it is written in medical language and may appear blunt, direct, or contain abbreviations or verbiage that are unfamiliar. Medical documents are intended to carry relevant information, facts as evident, and the clinical opinion of the practitioner.     This report has been produced using speech recognition software, and may contain errors related to grammar, punctuation, spelling, words or phrases unrecognized or not translated appropriately to text; these errors may be referred to the dictating provider for further clarification and/or addendum as needed.

## 2024-03-05 ENCOUNTER — TELEPHONE (OUTPATIENT)
Dept: FAMILY MEDICINE CLINIC | Facility: CLINIC | Age: 24
End: 2024-03-05

## 2024-03-05 LAB — HPV I/H RISK 1 DNA SPEC QL NAA+PROBE: NEGATIVE

## 2024-03-05 NOTE — TELEPHONE ENCOUNTER
SNOW for Brielle to return a call. She had an appointment with  3/4/24. Her PHQ9 score requires a CSSR to be completed.

## 2024-03-07 LAB
.: NORMAL
.: NORMAL

## 2024-03-09 ENCOUNTER — LAB ENCOUNTER (OUTPATIENT)
Dept: LAB | Facility: HOSPITAL | Age: 24
End: 2024-03-09
Attending: FAMILY MEDICINE
Payer: COMMERCIAL

## 2024-03-09 DIAGNOSIS — E55.9 VITAMIN D DEFICIENCY: ICD-10-CM

## 2024-03-09 DIAGNOSIS — E03.9 HYPOTHYROIDISM, UNSPECIFIED TYPE: ICD-10-CM

## 2024-03-09 DIAGNOSIS — Z01.419 WELL WOMAN EXAM WITH ROUTINE GYNECOLOGICAL EXAM: ICD-10-CM

## 2024-03-09 LAB
ALBUMIN SERPL-MCNC: 3.5 G/DL (ref 3.4–5)
ALBUMIN/GLOB SERPL: 1.1 {RATIO} (ref 1–2)
ALP LIVER SERPL-CCNC: 68 U/L
ALT SERPL-CCNC: 25 U/L
ANION GAP SERPL CALC-SCNC: 5 MMOL/L (ref 0–18)
AST SERPL-CCNC: 17 U/L (ref 15–37)
BASOPHILS # BLD AUTO: 0.03 X10(3) UL (ref 0–0.2)
BASOPHILS NFR BLD AUTO: 0.5 %
BILIRUB SERPL-MCNC: 1.4 MG/DL (ref 0.1–2)
BUN BLD-MCNC: 7 MG/DL (ref 9–23)
CALCIUM BLD-MCNC: 9 MG/DL (ref 8.5–10.1)
CHLORIDE SERPL-SCNC: 113 MMOL/L (ref 98–112)
CHOLEST SERPL-MCNC: 201 MG/DL (ref ?–200)
CO2 SERPL-SCNC: 24 MMOL/L (ref 21–32)
CREAT BLD-MCNC: 0.8 MG/DL
EGFRCR SERPLBLD CKD-EPI 2021: 107 ML/MIN/1.73M2 (ref 60–?)
EOSINOPHIL # BLD AUTO: 0.08 X10(3) UL (ref 0–0.7)
EOSINOPHIL NFR BLD AUTO: 1.4 %
ERYTHROCYTE [DISTWIDTH] IN BLOOD BY AUTOMATED COUNT: 13.3 %
FASTING PATIENT LIPID ANSWER: YES
FASTING STATUS PATIENT QL REPORTED: YES
GLOBULIN PLAS-MCNC: 3.3 G/DL (ref 2.8–4.4)
GLUCOSE BLD-MCNC: 92 MG/DL (ref 70–99)
HCT VFR BLD AUTO: 38.6 %
HDLC SERPL-MCNC: 51 MG/DL (ref 40–59)
HGB BLD-MCNC: 14.1 G/DL
IMM GRANULOCYTES # BLD AUTO: 0.01 X10(3) UL (ref 0–1)
IMM GRANULOCYTES NFR BLD: 0.2 %
LDLC SERPL CALC-MCNC: 139 MG/DL (ref ?–100)
LYMPHOCYTES # BLD AUTO: 2.05 X10(3) UL (ref 1–4)
LYMPHOCYTES NFR BLD AUTO: 35.9 %
MCH RBC QN AUTO: 32.3 PG (ref 26–34)
MCHC RBC AUTO-ENTMCNC: 36.5 G/DL (ref 31–37)
MCV RBC AUTO: 88.5 FL
MONOCYTES # BLD AUTO: 0.35 X10(3) UL (ref 0.1–1)
MONOCYTES NFR BLD AUTO: 6.1 %
NEUTROPHILS # BLD AUTO: 3.19 X10 (3) UL (ref 1.5–7.7)
NEUTROPHILS # BLD AUTO: 3.19 X10(3) UL (ref 1.5–7.7)
NEUTROPHILS NFR BLD AUTO: 55.9 %
NONHDLC SERPL-MCNC: 150 MG/DL (ref ?–130)
OSMOLALITY SERPL CALC.SUM OF ELEC: 292 MOSM/KG (ref 275–295)
PLATELET # BLD AUTO: 202 10(3)UL (ref 150–450)
POTASSIUM SERPL-SCNC: 3.6 MMOL/L (ref 3.5–5.1)
PROT SERPL-MCNC: 6.8 G/DL (ref 6.4–8.2)
RBC # BLD AUTO: 4.36 X10(6)UL
SODIUM SERPL-SCNC: 142 MMOL/L (ref 136–145)
TRIGL SERPL-MCNC: 60 MG/DL (ref 30–149)
TSI SER-ACNC: 1.77 MIU/ML (ref 0.36–3.74)
VIT D+METAB SERPL-MCNC: 24.1 NG/ML (ref 30–100)
VLDLC SERPL CALC-MCNC: 11 MG/DL (ref 0–30)
WBC # BLD AUTO: 5.7 X10(3) UL (ref 4–11)

## 2024-03-09 PROCEDURE — 80053 COMPREHEN METABOLIC PANEL: CPT

## 2024-03-09 PROCEDURE — 80061 LIPID PANEL: CPT

## 2024-03-09 PROCEDURE — 85025 COMPLETE CBC W/AUTO DIFF WBC: CPT

## 2024-03-09 PROCEDURE — 84443 ASSAY THYROID STIM HORMONE: CPT

## 2024-03-09 PROCEDURE — 82306 VITAMIN D 25 HYDROXY: CPT

## 2024-03-09 PROCEDURE — 36415 COLL VENOUS BLD VENIPUNCTURE: CPT

## 2024-03-12 DIAGNOSIS — E55.9 VITAMIN D DEFICIENCY: Primary | ICD-10-CM

## 2024-03-12 RX ORDER — ERGOCALCIFEROL 1.25 MG/1
50000 CAPSULE ORAL WEEKLY
Qty: 12 CAPSULE | Refills: 0 | Status: SHIPPED | OUTPATIENT
Start: 2024-03-12

## 2024-04-01 DIAGNOSIS — E03.9 HYPOTHYROIDISM, UNSPECIFIED TYPE: ICD-10-CM

## 2024-04-01 RX ORDER — LEVOTHYROXINE SODIUM 0.03 MG/1
TABLET ORAL
Qty: 90 TABLET | Refills: 0 | Status: SHIPPED | OUTPATIENT
Start: 2024-04-01

## 2024-04-01 NOTE — TELEPHONE ENCOUNTER
Requested Prescriptions     Pending Prescriptions Disp Refills    LEVOTHYROXINE 25 MCG Oral Tab [Pharmacy Med Name: LEVOTHYROXINE 0.025MG (25MCG) TAB] 90 tablet 0     Sig: TAKE 1 TABLET(25 MCG) BY MOUTH BEFORE BREAKFAST     LOV 3/4/2024     Patient was asked to follow-up in:     Appointment scheduled: Visit date not found     Medication refilled per protocol.

## 2024-04-22 ENCOUNTER — OFFICE VISIT (OUTPATIENT)
Dept: INTERNAL MEDICINE CLINIC | Facility: CLINIC | Age: 24
End: 2024-04-22
Payer: COMMERCIAL

## 2024-04-22 VITALS
RESPIRATION RATE: 16 BRPM | HEIGHT: 67 IN | DIASTOLIC BLOOD PRESSURE: 78 MMHG | WEIGHT: 238 LBS | BODY MASS INDEX: 37.35 KG/M2 | SYSTOLIC BLOOD PRESSURE: 112 MMHG | HEART RATE: 80 BPM

## 2024-04-22 DIAGNOSIS — F32.1 CURRENT MODERATE EPISODE OF MAJOR DEPRESSIVE DISORDER WITHOUT PRIOR EPISODE (HCC): ICD-10-CM

## 2024-04-22 DIAGNOSIS — Z83.3 FAMILY HISTORY OF DIABETES MELLITUS IN GRANDFATHER: ICD-10-CM

## 2024-04-22 DIAGNOSIS — Z51.81 THERAPEUTIC DRUG MONITORING: Primary | ICD-10-CM

## 2024-04-22 DIAGNOSIS — E66.9 OBESITY (BMI 30-39.9): ICD-10-CM

## 2024-04-22 RX ORDER — HYDROXYZINE 50 MG/1
TABLET, FILM COATED ORAL
COMMUNITY
Start: 2024-03-06

## 2024-04-22 NOTE — PROGRESS NOTES
HISTORY OF PRESENT ILLNESS  Chief Complaint   Patient presents with    Weight Problem     Recommendation from Insurance providers list, never try meds and open for meds     Sadia Dan is a 23 year old female new to our office today for initiation of medical weight loss program.  Patient presents today with c/o excess weight. 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     Reason/goal for weight loss: reach #200 lbs in 6 months and be at a healthy weight and feel confident in all aspects of my health in 1 year   Triggers for weight gain? Stress  Previous weight loss programs? NO  Previous weight loss medications?  none    Reviewed [facility] patient contract: Readiness for Lifestyle change: 10/10, Interest in Medication: 10/10, Bariatric surgery interest: 3/10    Weight  Starting weight: 238  Max weight: 260  Lowest weight: 190    Wt Readings from Last 6 Encounters:   04/22/24 238 lb (108 kg)   03/04/24 226 lb (102.5 kg)   02/21/23 247 lb (112 kg)   02/11/23 240 lb (108.9 kg)   01/17/23 256 lb (116.1 kg)   12/02/22 240 lb (108.9 kg)        Typical diet   Breakfast Lunch Dinner Snacks Fluids   N/A  grilled chicken sandwhich- no bun from portillos and large johnson  15 oz grilled chicken  Sugar free jello, yogurt  Water: 40oz- 1 gallon    Soda:  Juice:  Coffee/Tea:  Energy drink in AM (not daily)   Poppi (prebiotic soda)      Portion: medium  Eats 3 meals per day: yes   Number of restaurant or fast food meals/week: 1-2 meals/week    Social hx and lifestyle reviewed:    Work:  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, used to work at [employer]  Marital status:  single  Support: yes  Tobacco use: none  ETOH use:  0 per/week  Supplements: none  Exercise: 2 days per week- dance  exercise   Stress level: 8/10  Sleep hours and integrity: 8-9 Hours per night (but wakes up 2-3 times)     MEDICAL HISTORY  PMH reviewed:   Cardiac disorders:negative   Depression/anxiety: anxiety/depression   Glaucoma: negative  Kidney stones: negative  Eating disorder: negative  Migraines/seizures: negative  Joint-related conditions:negative  Liver disease: negative  Thyroid disease: hypothyroid   Constipation: negative  Diabetes: negative  Sleep Apnea hx: +snoring - has never done sleep study   Cancer hx: negative  DVT: negative  Family or personal history of Pancreatic issues / Medullary Thyroid Cancer: negative  History of bariatric surgery: negative    FMH reviewed: obesity in parent/s or sibling: mother, father and sister     REVIEW OF SYSTEMS  GENERAL: feels well otherwise, + fatigue   SKIN: denies any rashes to skin folds  HEENT: snoring- yes; denies neck thickening  LUNGS: denies shortness of breath with exertion, no apnea  CARDIOVASCULAR: denies chest pain on exertion, denies palpitations or pedal edema  GI: denies abdominal pain, distention, No N/V/D/C  MUSCULOSKELETAL: denies back pain, joint pains   NEURO: denies headaches or dizziness  ENDOCRINE: denies any excess hunger, urination or thirst, denies any purple striae  PSYCH: denies change in behavior or mood, hx of anxiety/depression     EXAM  /78   Pulse 80   Resp 16   Ht 5' 7\" (1.702 m)   Wt 238 lb (108 kg)   BMI 37.28 kg/m² , Percent body fat: F >32% Female 42.1%;  visceral fat 8 Muscle Mass: 33.5lbs%       GENERAL: well developed, well nourished, in no apparent distress  SKIN: warm, pink, dry without rashes to exposed area   EYES: conjunctiva pink, sclera non icteric, PERRLA  HEENT: atraumatic, normocephalic, O/p: Mallampati score- 2  NECK: supple, non tender, no adenopathy, no thyromegaly  LUNGS: CTA in all fields,  breathing non labored  CARDIO: RRR without murmur, normal S1 and S2 without clicks or gallops, no pedal edema  GI: +BS, soft, no masses, HSM or tenderness  EXTREMITIES: grossly intact  NEURO: Oriented times three, full ROM of bilateral UE/LE  PSYCH: pleasant, cooperative, normal mood and affect    Lab Results   Component Value Date    GLU 92 03/09/2024    BUN 7 (L) 03/09/2024    BUNCREA 8.3 (L) 10/04/2019    CREATSERUM 0.80 03/09/2024    ANIONGAP 5 03/09/2024    GFRNAA 108 12/06/2021    GFRAA 125 12/06/2021    CA 9.0 03/09/2024    OSMOCALC 292 03/09/2024    ALKPHO 68 03/09/2024    AST 17 03/09/2024    ALT 25 03/09/2024    BILT 1.4 03/09/2024    TP 6.8 03/09/2024    ALB 3.5 03/09/2024    GLOBULIN 3.3 03/09/2024     03/09/2024    K 3.6 03/09/2024     (H) 03/09/2024    CO2 24.0 03/09/2024     No results found for: \"EAG\", \"A1C\"  Lab Results   Component Value Date    CHOLEST 201 (H) 03/09/2024    TRIG 60 03/09/2024    HDL 51 03/09/2024     (H) 03/09/2024    VLDL 11 03/09/2024    TCHDLRATIO 3.3 10/05/2011    NONHDLC 150 (H) 03/09/2024     No results found for: \"B12\", \"VITB12\"  Lab Results   Component Value Date    VITD 24.1 (L) 03/09/2024       Current Outpatient Medications on File Prior to Visit   Medication Sig Dispense Refill    hydrOXYzine 50 MG Oral Tab       LEVOTHYROXINE 25 MCG Oral Tab TAKE 1 TABLET(25 MCG) BY MOUTH BEFORE BREAKFAST 90 tablet 0    ergocalciferol 1.25 MG (36831 UT) Oral Cap Take 1 capsule (50,000 Units total) by mouth once a week. 12 capsule 0    Sulfacetamide Sodium, Acne, 10 % External Lotion APPLY A PEA SIZE AMOUNT TO FACE ONCE DAILY IN HE MORNING AFTER WASHING FACE      tretinoin 0.025 % External Cream APPLY PEA SIZED AMOUNT TO FACE EVERY OTHER NIGHT UNTIL TOLERATED EVERY NIGHT AT LEAST 1 TO 2 HOURS BEFORE BEDTIME      QUEtiapine (SEROQUEL) 100 MG Oral Tab Take 1 tablet (100 mg total) by mouth nightly. 90 tablet 0    VIENVA 0.1-20 MG-MCG Oral Tab TAKE 1 TABLET BY MOUTH  DAILY. SKIP PLACEBO 84 tablet 5    fluticasone propionate 50 MCG/ACT Nasal Suspension 1 spray by Each Nare route in the morning and 1 spray before bedtime. 18.2 mL 3    loratadine 10 MG Oral Tab Take 1 tablet (10 mg total) by mouth daily. 30 tablet 5    albuterol (PROAIR HFA) 108 (90 Base) MCG/ACT Inhalation Aero Soln Use 1-2 puffs every 4-6 hours or before exercise. 18 g 1    Butalbital-APAP-Caffeine (FIORICET) -40 MG Oral Cap Take 1 capsule by mouth every 6 (six) hours as needed for Headaches. 30 capsule 1     No current facility-administered medications on file prior to visit.       ASSESSMENT/PLAN    ICD-10-CM    1. Therapeutic drug monitoring  Z51.81 Vitamin B12     Hemoglobin A1C      2. Obesity (BMI 30-39.9)  E66.9 Vitamin B12     Hemoglobin A1C      3. Current moderate episode of major depressive disorder without prior episode ([facility])  F32.1       4. Family history of diabetes mellitus in grandfather  Z83.3         Initial Weight Data and Goal Weight Loss:  Weight Calculations  Initial Weight: 238 lbs  Initial Weight Date: 04/22/24  Today's Weight: 238 lbs  5% Goal: 11.9  10% Goal: 23.8  Total Weight Loss: 0 lbs  Initial consult: 238 lbs on 4/22/2024, Down  Lb:  lbs total     PLAN   Will hold on starting medications:   --advised of side effects and adverse effects of this medication  Contradictions: none  Body composition test results given   Reviewed labs, baseline labs ordered  Continue with vitamin d OTC   Anxiety/depression, stable, on seroquel managed by PCP   Hypothyroid- stable, continue with current medication regimen, managed by PCP   Decrease carbs, increase protein, no skipping meals   Needs to incorporate exercise regimen FITTE: ACSM recommendations (150-300 minutes/ week in active weight loss)   Follow up with dietitian and psychologist as recommended.  Discussed the role of sleep and stress in weight management.  Counseled on comprehensive weight loss plan including attention to nutrition,  exercise and behavior/stress management for success. See patient instruction below for more details.    Total time spent on chart review, pre-charting, obtaining history, counseling, and educating, reviewing labs was 50 minutes.       NOTE TO PATIENT: The 21st Century Cures Act makes clinical notes like these available to patients in the interest of transparency. Clinical notes are medical documents used by physicians and care providers to communicate with each other. These documents include medical language and terminology, abbreviations, and treatment information that may sound technical and at times possibly unfamiliar. In addition, at times, the verbiage may appear blunt or direct. These documents are one tool providers use to communicate relevant information and clinical opinions of the care providers in a way that allows common understanding of the clinical context.     Weight Loss Contract reviewed and signed today 4/22/2024    Patient Instructions   Welcome to the [facility] Management Program!!  Thank you for placing your trust in our health care team, I look forward to working with you along this journey to better health!  Next steps:     1.  Schedule a personal nutrition consultation with one of our registered dieticians. Bring along your food journal (3 days minimum). See journal options below.  2.  get blood work done      Please try to work on the following dietary changes this first month:  Daily protein recommendation to start:  grams  Daily carbohydrate: <150g  Daily calories: 1,700-1,800  1.  Drink water with meals and throughout the day, cut down on soda and/or juice if consumed. Consider flavored water options like Bubbly, Spindrift, Hint and Tasneem.  2.  Eat breakfast daily and focus on having protein with each meal, examples include: greek yogurt, cottage cheese, hard boiled egg, whole grain toast with peanut butter.   3.  Reduce refined carbohydrates and sugars which  includes items such as sweets, as well as rice, pasta, and bread and make sure to choose whole grain options when having them with just 1 serving per meal about the size of your inner palm.  4.  Consume non starchy veggies daily working towards making them a good 50% of your daily food intake. Add them to lunch and dinner consistently.  5.  Optional can start a daily probiotic: VSL#3, (order on line at www.vsl3.com). Take 1 capsule daily with water for 30 days, then reduce to 1 every other day (this will reduce the cost). Capsules can be left out for 2 weeks, but then must be refrigerated.      Please download delfina My Fitness Pal, LoseIt! Or Net Diary to monitor daily dietary intake and you will be able to see if you are eating the right amount of calories or too much or too little which would hinder weight loss. Additionally this will help to see your daily carbohydrate and protein intake. When you set the delfina up choose 1-2 lbs/week as a goal.  Keeping a paper food journal is an option as well to remain accountable for your choices- this is the start to mindful eating! A low calorie diet has been consistently shown to support weight loss.     Continue or start exercising to help establish a routine. If not already exercising begin with 1 day and progress as able with long-term goal of 30 minutes 5 days a week at a minimum.     Meditation daily can help manage and control stress. Chronic stress can make weight loss difficult.  Exercising is one way to help with stress, but meditation using the CALM Delfina or another comparable alternative can be done in your home or place of work with little time commitment. This Delfina can also help work on behavior change and improve sleep. Check out the segment under Calm Masterclass and listen to The 4 Pillars of Health. A great way to begin learning about the foundation of lifestyle with practical tips to use in your every day.     Check out www.yourweightmatters.org blog for  continued daily support and education along this weight loss journey!    Patient Resources:     Personal Training/Fitness Classes/Health Coaching     [facility] @ [email or web address] Full fitness center with group fitness and personal training. Discount available as client of [facility].  Health Coaching and Personal Training with Charlotte Edwards at our [place] [facility]- individual weekly coaching with option to add personal training and small group fitness classes targeted at weight loss- [phone] and/or email @ [email or web address]  360FIT [place] [email or web address].. Group Fitness [phone] and/or email Jenifer at [email or web address].  [facility] Fitness Centers with multiple locations: [facility] (www.Solasta), IMScouting (www.Kash), BlackBridge (www.Pinnacle Medical Solutions), The Exercise  (www.exercisecoach.[place] [employer] [place])     Online Fitness  Fitness  on [place]  Fit in 10 DVD series- www.naogw15RZK.com  Sit and Be Fit - Chair exercise series Www.sitandbefit.org  Hip Hop Fit with Sumit Greer at www.hiphopfit.net     Apps for on the Go Fitness  [employer] 7 Minute Workout (orange box with white 7) - free on the go HIIT training delfina  Peloton Delfina @ www.onepeloton.com     Nutrition Trackers and Tools  LoseIT! And My Fitness Pal apps and on line for tracking nutrition  NOOM - virtual health coaching  FitFoundation (healthy meals on the go) in [place] @ www.adphilgtegspq6r.[place] [place]  Bistro MD @ www.bistromd.com and Jtrqvr28 (keto and low carb plans recommended) @ www.jvfczn30.com, Metabolic Meals @ www.MyMetabolicMeals.com - individual prepared meals to go  Gobble, Blue Apron, Home , Every Plate, Sunbasket- on line meal delivery programs for preparation at home  Meal Village in [place] for homemade meals to go @ www.Trac [employer]c [employer] Safety.[place] [employer] [place]  Diet Doctor @  www.dietdoctor.com - low carb swaps  Yusciencebite - meal prep and planning babita (www.yummly.com)     Stress Management/Behavior/Mindful Eating  CALM meditation babita (www.calm.com)  Headspace  Am I Hungry? Mindful eating virtual  babita  Www.yourweightmatters.org - Obesity Action Coalition sponsored Blog posts daily  Motivation babita (black box with white \")- daily supportive messages sent to your phone     Books/Video Education/Podcasts  Mindless Eating by Chidi Bey  Why We Get Sick by Bulmaro Armenta (a book about insulin resistance)  Atomic Habits by Tulio Johns (a book about taking small steps to promote greater behavior change)   Can't Hurt Me by Anil Carrasco (a book exploring the power of discipline in achieving your goals)  The End of Dieting: How to Live for Life by Dr. Shan Caballero M.D. or listen to The [employer] [facility] Podcast Episode 63: Understanding \"Nutritarian\" Eating w/Dr. Shan Caballero  Your Body in Balance: The New Science of Food, Hormones, and Health by Dr. Emerson Kessler  The Menopause Diet Plan by Marielle Aggarwal and Rubi Mcelroy  The Complete Guide to fasting by Dr. Morrow  Sugar, Salt & Fat by Astrid Sadler, Ph.D, R.D.  Weight Loss Surgery Will Not Treat Food Addiction by Lisa Inman Ph.D  The Game Changers- Seesearch Documentary on plant based nutrition  Fed Up - documentary about obesity (Free on ToolWire)  The Truth About Sugar - documentary on sugar (Free on ToolWire, https://youtu.be/0V1ahfeET3v)  The Dr. Tinsley T5 Wellness Plan by Dr. Andrea Tinsley MD  Fitlosophy Fitspiration - journal to better health (found at [employer] in fitness aisle)  What Happened to You?- a look at the impact trauma has on behavior written by Homar Allen and Dr. Mahesh Keith  Whole Again by Neil Rivers - discovering your true self after trauma  Franky Correia talk on Netflix, The Call to Courage  Podcasts: The Exam Room by the Physician's Committee, Nutrition Facts by Dr. Gutierrez    We are here to support you with weight  loss, but please remember that you still need your primary care provider for your routine health maintenance.      No follow-ups on file.    Patient verbalizes understanding.    Adriana Carter, APRN

## 2024-04-22 NOTE — PATIENT INSTRUCTIONS
Welcome to the [facility] Management Program!!  Thank you for placing your trust in our health care team, I look forward to working with you along this journey to better health!  Next steps:     1.  Schedule a personal nutrition consultation with one of our registered dieticians. Bring along your food journal (3 days minimum). See journal options below.  2.  get blood work done      Please try to work on the following dietary changes this first month:  Daily protein recommendation to start:  grams  Daily carbohydrate: <150g  Daily calories: 1,700-1,800  1.  Drink water with meals and throughout the day, cut down on soda and/or juice if consumed. Consider flavored water options like Bubbly, Spindrift, Hint and Tasneem.  2.  Eat breakfast daily and focus on having protein with each meal, examples include: greek yogurt, cottage cheese, hard boiled egg, whole grain toast with peanut butter.   3.  Reduce refined carbohydrates and sugars which includes items such as sweets, as well as rice, pasta, and bread and make sure to choose whole grain options when having them with just 1 serving per meal about the size of your inner palm.  4.  Consume non starchy veggies daily working towards making them a good 50% of your daily food intake. Add them to lunch and dinner consistently.  5.  Optional can start a daily probiotic: VSL#3, (order on line at www.vsl3.com). Take 1 capsule daily with water for 30 days, then reduce to 1 every other day (this will reduce the cost). Capsules can be left out for 2 weeks, but then must be refrigerated.      Please download babita My Fitness Pal, LoseIt! Or Net Diary to monitor daily dietary intake and you will be able to see if you are eating the right amount of calories or too much or too little which would hinder weight loss. Additionally this will help to see your daily carbohydrate and protein intake. When you set the babita up choose 1-2 lbs/week as a goal.  Keeping a paper food  journal is an option as well to remain accountable for your choices- this is the start to mindful eating! A low calorie diet has been consistently shown to support weight loss.     Continue or start exercising to help establish a routine. If not already exercising begin with 1 day and progress as able with long-term goal of 30 minutes 5 days a week at a minimum.     Meditation daily can help manage and control stress. Chronic stress can make weight loss difficult.  Exercising is one way to help with stress, but meditation using the CALM Delfina or another comparable alternative can be done in your home or place of work with little time commitment. This Delfina can also help work on behavior change and improve sleep. Check out the segment under Calm Masterclass and listen to The 4 Pillars of Health. A great way to begin learning about the foundation of lifestyle with practical tips to use in your every day.     Check out www.yourweightmatters.org blog for continued daily support and education along this weight loss journey!    Patient Resources:     Personal Training/Fitness Classes/Health Coaching     [facility] @ [email or web address]-center Full fitness center with group fitness and personal training. Discount available as client of [facility].  Health Coaching and Personal Training with Charlotte Edwards at our [place] [facility]- individual weekly coaching with option to add personal training and small group fitness classes targeted at weight loss- [phone] and/or email @ [email or web address]  360FIT [place] [email or web address]. Group Fitness [phone] and/or email [email or web address] [email or web address]  [facility] Fitness Centers with multiple locations: [employer] (www.VetCentric), IMT (Innovative Micro Technology) The Azure Minerals Fitness (www.Liquor.com.InstantQ), WeAre.Us (www.Cloud Your Car), The Exercise   (www.exercisecoach.Ciao Telecom)     Online Fitness  Fitness  on Utube  Fit in 10 DVD series- www.rcwnw13CEV.com  Sit and Be Fit - Chair exercise series Www.sitandbefit.org  Hip Hop Fit with Sumit Greer at www.hiphopfit.net     Apps for on the Go Fitness  [place] 7 Minute Workout (orange box with white 7) - free on the go HIIT training delfina  Peloton Delfina @ www.onepeloton.com     Nutrition Trackers and Tools  LoseIT! And My Fitness Pal apps and on line for tracking nutrition  NOOM - virtual health coaching  FitFoundation (healthy meals on the go) in [place] @ www.mwvjvfsrajrgm5bJ&V Big Game Outfitters  Bistro MD @ Wearhausbistromd.com and Wdgeue77 (keto and low carb plans recommended) @ wwwSecret Saleszymdyu65.com, Metabolic Meals @ www.FinestrellaMetabolicMeals.Ciao Telecom - individual prepared meals to go  Gobble, Blue Apron, Home , Every Plate, Sunbasket- on line meal delivery programs for preparation at home  Meal Village in [place] for homemade meals to go @ wwwSecret SalesmealAllSource Analysis  Diet Doctor @ www.dietdoctor.com - low carb swaps  GlassUp - meal prep and planning delfina (www.yummly.com)     Stress Management/Behavior/Mindful Eating  CALM meditation delfina (www.calm.com)  Headspace  Am I Hungry? Mindful eating virtual  delfina  Www.yourweightmatters.org - Obesity Action Coalition sponsored Blog posts daily  Motivation delfina (black box with white \")- daily supportive messages sent to your phone     Books/Video Education/Podcasts  Mindless Eating by Chidi Bey  Why We Get Sick by Bulmaro Armenta (a book about insulin resistance)  Atomic Habits by Tulio Johns (a book about taking small steps to promote greater behavior change)   Can't Hurt Me by Anil Carrasco (a book exploring the power of discipline in achieving your goals)  The End of Dieting: How to Live for Life by Dr. Shan Caballero M.D. or listen to The [employer] Podcast Episode 63: Understanding \"Nutritarian\" Eating w/Dr. Shan Caballero  Your Body in Balance: The New Science of Food, Hormones, and Health by   Emerson Kessler  The Menopause Diet Plan by Marielle Aggarwal and Rubi Mcelroy  The Complete Guide to fasting by Dr. Morrow  Sugar, Salt & Fat by Astrid Sadler, Ph.D, R.D.  Weight Loss Surgery Will Not Treat Food Addiction by Lisa Inman Ph.D  The Game Changers- Netflix Documentary on plant based nutrition  Fed Up - documentary about obesity (Free on Utube)  The Truth About Sugar - documentary on sugar (Free on Utube, https://youtu.be/9B9kuqwIU3s)  The Dr. Tinsley T5 Wellness Plan by Dr. Andrea Tinsley MD  Fitlosophy Fitspiration - journal to better health (found at [employer] in fitness aisle)  What Happened to You?- a look at the impact trauma has on behavior written by Homar Allen and Dr. Mahesh Keith  Whole Again by Neil Rivers - discovering your true self after trauma  Franky Correia talk on [employer], The Call to Courage  Podcasts: The Exam Room by the Physician's Committee, Nutrition Facts by Dr. Gutierrez    We are here to support you with weight loss, but please remember that you still need your primary care provider for your routine health maintenance.

## 2024-04-27 ENCOUNTER — LAB ENCOUNTER (OUTPATIENT)
Dept: LAB | Age: 24
End: 2024-04-27
Attending: NURSE PRACTITIONER
Payer: COMMERCIAL

## 2024-04-27 DIAGNOSIS — Z51.81 THERAPEUTIC DRUG MONITORING: ICD-10-CM

## 2024-04-27 DIAGNOSIS — E66.9 OBESITY (BMI 30-39.9): ICD-10-CM

## 2024-04-27 LAB
EST. AVERAGE GLUCOSE BLD GHB EST-MCNC: 103 MG/DL (ref 68–126)
HBA1C MFR BLD: 5.2 % (ref ?–5.7)
VIT B12 SERPL-MCNC: 325 PG/ML (ref 193–986)

## 2024-04-27 PROCEDURE — 83036 HEMOGLOBIN GLYCOSYLATED A1C: CPT

## 2024-04-27 PROCEDURE — 82607 VITAMIN B-12: CPT

## 2024-04-27 PROCEDURE — 36415 COLL VENOUS BLD VENIPUNCTURE: CPT

## 2024-04-29 NOTE — PROGRESS NOTES
A1c is normal   Mildly low Vitamin b12 level, please start daily over the counter b complex vitamin

## 2024-06-26 DIAGNOSIS — E03.9 HYPOTHYROIDISM, UNSPECIFIED TYPE: ICD-10-CM

## 2024-06-27 RX ORDER — LEVOTHYROXINE SODIUM 0.03 MG/1
25 TABLET ORAL
Qty: 90 TABLET | Refills: 1 | Status: SHIPPED | OUTPATIENT
Start: 2024-06-27

## 2024-06-27 NOTE — TELEPHONE ENCOUNTER
Requested Prescriptions     Pending Prescriptions Disp Refills    levothyroxine 25 MCG Oral Tab 90 tablet 0     Sig: Take 1 tablet (25 mcg total) by mouth before breakfast.     LOV 3/4/2024     Patient was asked to follow-up in: 1 year    Appointment scheduled: Visit date not found     Medication refilled per protocol.

## 2024-10-06 DIAGNOSIS — Z30.41 ENCOUNTER FOR SURVEILLANCE OF CONTRACEPTIVE PILLS: ICD-10-CM

## 2024-10-06 DIAGNOSIS — E03.9 HYPOTHYROIDISM, UNSPECIFIED TYPE: ICD-10-CM

## 2024-10-06 RX ORDER — LEVOTHYROXINE SODIUM 25 UG/1
25 TABLET ORAL
Qty: 90 TABLET | Refills: 1 | Status: CANCELLED | OUTPATIENT
Start: 2024-10-06

## 2024-10-07 RX ORDER — LEVONORGESTREL/ETHIN.ESTRADIOL 0.1-0.02MG
1 TABLET ORAL DAILY
Qty: 84 TABLET | Refills: 0 | Status: SHIPPED | OUTPATIENT
Start: 2024-10-07

## 2024-10-07 NOTE — TELEPHONE ENCOUNTER
Requested Prescriptions     Pending Prescriptions Disp Refills    Levonorgestrel-Ethinyl Estrad (VIENVA) 0.1-20 MG-MCG Oral Tab 84 tablet 5     Sig: Take 1 tablet by mouth daily. SKIP PLACEBO    levothyroxine 25 MCG Oral Tab 90 tablet 1     Sig: Take 1 tablet (25 mcg total) by mouth before breakfast.     LOV 3/4/2024     Patient was asked to follow-up in: 1 year    Appointment scheduled: Visit date not found     Medication refilled per protocol.    Levothyroxine: duplicate request

## 2024-10-23 ENCOUNTER — OFFICE VISIT (OUTPATIENT)
Dept: INTERNAL MEDICINE CLINIC | Facility: CLINIC | Age: 24
End: 2024-10-23
Payer: COMMERCIAL

## 2024-10-23 VITALS
WEIGHT: 268 LBS | HEIGHT: 67 IN | HEART RATE: 94 BPM | SYSTOLIC BLOOD PRESSURE: 120 MMHG | RESPIRATION RATE: 16 BRPM | DIASTOLIC BLOOD PRESSURE: 80 MMHG | BODY MASS INDEX: 42.06 KG/M2

## 2024-10-23 DIAGNOSIS — E66.01 OBESITY, MORBID, BMI 40.0-49.9 (HCC): ICD-10-CM

## 2024-10-23 DIAGNOSIS — Z51.81 THERAPEUTIC DRUG MONITORING: Primary | ICD-10-CM

## 2024-10-23 PROCEDURE — 99214 OFFICE O/P EST MOD 30 MIN: CPT | Performed by: NURSE PRACTITIONER

## 2024-10-23 RX ORDER — PHENTERMINE HYDROCHLORIDE 15 MG/1
15 CAPSULE ORAL EVERY MORNING
Qty: 30 CAPSULE | Refills: 3 | Status: SHIPPED | OUTPATIENT
Start: 2024-10-23

## 2024-10-23 NOTE — PROGRESS NOTES
HISTORY OF PRESENT ILLNESS  Chief Complaint   Patient presents with    Weight Check     Up 30     Sadia Dan is a 24 year old female here for follow up with medical weight loss program for the treatment of overweight, obesity, or morbid obesity.     Up #30 lbs (first month f/u from 4/2024)  Was not started on any medications for weight loss     Admits that she has been very stressed the past couple of months...   Lost job--> went back to it...  Bought a new house in June (dealing with repairs)  Challenging: cravings and weight gain   Was tracking food in the beginning MFP (but had stopped)   Exercise/Activity: 3x/ week, via walking, not doing anything routine as far as exercise  Nutrition: eating regular meals, +protein, minimal veggies. + interm. tracking reports  Meals out per week on average: 2  Stress is manageable   Sleep: 6 hours/night, waking up feeling rested    Denies chest pain, shortness of breath, dizziness, blurred vision, headache, paresthesia, nausea/vomiting.     Breakfast Lunch Dinner Snacks Fluids   Reviewed              Wt Readings from Last 6 Encounters:   10/23/24 268 lb (121.6 kg)   04/22/24 238 lb (108 kg)   03/04/24 226 lb (102.5 kg)   02/21/23 247 lb (112 kg)   02/11/23 240 lb (108.9 kg)   01/17/23 256 lb (116.1 kg)          REVIEW OF SYSTEMS  GENERAL: feels well otherwise, denied any fevers chills or night sweats   LUNGS: denies shortness of breath  CARDIOVASCULAR: denies chest pain  GI: denies abdominal pain  MUSCULOSKELETAL: denies back pain, joint pains   PSYCH: denies change in behavior or mood, denies feeling sad or depressed    EXAM  /80   Pulse 94   Resp 16   Ht 5' 7\" (1.702 m)   Wt 268 lb (121.6 kg)   LMP  (LMP Unknown)   BMI 41.97 kg/m²       GENERAL: well developed, well nourished, in no apparent distress, A/O x3  SKIN: no rashes, no suspicious lesions  HEENT: atraumatic, normocephalic, OP-clear, PERRLA  NECK: supple, no adenopathy  LUNGS: CTA in all fields,  breathing non labored  CARDIO: RRR without murmur  GI: +BS, NT/ND, no masses or HSM  EXTREMITIES: no cyanosis, no clubbing, no edema    Lab Results   Component Value Date    GLU 92 03/09/2024    BUN 7 (L) 03/09/2024    BUNCREA 8.3 (L) 10/04/2019    CREATSERUM 0.80 03/09/2024    ANIONGAP 5 03/09/2024    GFRNAA 108 12/06/2021    GFRAA 125 12/06/2021    CA 9.0 03/09/2024    OSMOCALC 292 03/09/2024    ALKPHO 68 03/09/2024    AST 17 03/09/2024    ALT 25 03/09/2024    BILT 1.4 03/09/2024    TP 6.8 03/09/2024    ALB 3.5 03/09/2024    GLOBULIN 3.3 03/09/2024     03/09/2024    K 3.6 03/09/2024     (H) 03/09/2024    CO2 24.0 03/09/2024     Lab Results   Component Value Date     04/27/2024    A1C 5.2 04/27/2024     Lab Results   Component Value Date    CHOLEST 201 (H) 03/09/2024    TRIG 60 03/09/2024    HDL 51 03/09/2024     (H) 03/09/2024    VLDL 11 03/09/2024    TCHDLRATIO 3.3 10/05/2011    NONHDLC 150 (H) 03/09/2024     Lab Results   Component Value Date    B12 325 04/27/2024     Lab Results   Component Value Date    VITD 24.1 (L) 03/09/2024       Medications Ordered Prior to Encounter[1]    ASSESSMENT/PLAN    ICD-10-CM    1. Therapeutic drug monitoring  Z51.81 Phentermine HCl 15 MG Oral Cap      2. Obesity, morbid, BMI 40.0-49.9 ([facility])  E66.01 Phentermine HCl 15 MG Oral Cap          PLAN   Initial Weight Data and Goal Weight Loss:  Initial consult: #238 lbs on 4/2024  Weight Calculations  Initial Weight: 238 lbs  Initial Weight Date: 04/22/24  Today's Weight: 268 lbs  5% Goal: 11.9  10% Goal: 23.8  Total Weight Loss: -30 lbs  Total weight loss: up #30 lbs total, Net loss +30 lbs  Will trial medications: phentermine 15mg discussed side effects including but not limited to:( elevated BP, tremor, anxiety, headache, constipation and serious side effects including arrhythmia and cad ) patient verbalized understanding agrees with the plan   --advised of side effects and adverse effects of this  medication  Contradictions: none  Reviewed labs  Continue with vitamin d OTC   Anxiety/depression, stable  Hypothyroid- stable, continue with current medication regimen, managed by PCP   Wrote out macros and encourage tracking   Stress is high, see HPI  Nutrition: Low carb diet, recommended to eat breakfast daily/ regular protein intake  Follow up with dietitian and psychologist as recommended.  Discussed the role of sleep and stress in weight management.  Counseled on comprehensive weight loss plan including attention to nutrition, exercise and behavior/stress management for success. See patient instruction below for more details.  Discussed strategies to overcome barriers to successful weight loss and weight maintenance  FITTE: ACSM recommendations (150-300 minutes/ week in active weight loss)   Weight Loss Consent to treat reviewed and signed.    Total time spent on chart review, pre-charting, obtaining history, counseling, and educating, reviewing labs was 30 minutes.       NOTE TO PATIENT: The 21st Century Cures Act makes clinical notes like these available to patients in the interest of transparency. Clinical notes are medical documents used by physicians and care providers to communicate with each other. These documents include medical language and terminology, abbreviations, and treatment information that may sound technical and at times possibly unfamiliar. In addition, at times, the verbiage may appear blunt or direct. These documents are one tool providers use to communicate relevant information and clinical opinions of the care providers in a way that allows common understanding of the clinical context.     Patient Instructions   Next steps:  1.  Fill your prescribed medication and take as discussed and prescribed:   2.  Schedule a personal nutrition consultation with one of our registered dieticians  3.  Check with insurance on coverage for GLP-1 meds (ie. Wegovy or Zepbound)      Please try to work on  the following dietary changes:  Daily protein recommendation to start:  grams  Daily carbohydrate:   Daily calories:   1.  Drink water with meals and throughout the day, cut down on soda and/or juice if consumed. Consider flavored water options like Bubbly, Spindrift, Hint and Tasneem.  2.  Eat breakfast daily and focus on having protein with each meal, examples include: greek yogurt, cottage cheese, hard boiled egg, whole grain toast with peanut butter.   3.  Reduce refined carbohydrates and sugars which includes items such as sweets, as well as rice, pasta, and bread and make sure to choose whole grain options when having them with just 1 serving per meal about the size of your inner palm.  4.  Consume non starchy veggies daily working towards making them a good 50% of your daily food intake. Add them to lunch and dinner consistently.  5.  Start a daily probiotic: VSL#3 is recommended, (order on line at www.vsl3.com). Take 1 capsule daily with water for 30 days, then reduce to 1 every other day (this will reduce the cost). Capsules can be left out for 2 weeks, but then must be refrigerated.      Please download babita My Fitness Pal, LoseIt! Or Net Diary to monitor daily dietary intake and you will be able to see if you are eating the right amount of calories or too much or too little which would hinder weight loss. Additionally this will help to see your daily carbohydrate and protein intake. When you set the babita up choose 1-2 lbs/week as a goal.  Keeping a paper food journal is an option as well to remain accountable for your choices- this is the start to mindful eating! A low calorie diet has been consistently shown to support weight loss.     Continue or start exercising to help establish a routine. If not already exercising begin with 1 day and progress as able with long-term goal of 30 minutes 5 days a week at a minimum.     Meditation daily can help manage and control stress. Chronic stress can make weight  loss difficult.  Exercising is one way to help with stress, but meditation using the CALM Delfina or another comparable alternative can be done in your home or place of work with little time commitment. This Delfina can also help work on behavior change and improve sleep. Check out the segment under Calm Masterclass and listen to The 4 Pillars of Health. A great way to begin learning about the foundation of lifestyle with practical tips to use in your every day.     Check out www.yourweightmatters.org blog for continued daily support and education along this weight loss journey!    Patient Resources:     Personal Training/Fitness Classes/Health Coaching     [facility] @ [email or web address]-center Full fitness center with group fitness and personal training. Discount available as client of [employer] [facility].  Health Coaching and Personal Training with Charlotte Edwards at our [facility]- individual weekly coaching with option to add personal training and small group fitness classes targeted at weight loss- [phone] and/or email @ [email or web address]  360FIT [place] [email or web address]. Group Fitness [phone] and/or email Jenifer at [email or web address]  PeaceHealthed Fitness Centers with multiple locations: [employer] (www.PathCentral), Eat The Second Chance [employer]ing Fitness (www.PostRocket.Snapstream), Fit Body Bootcamp (www.CryptoCurrency Inc.bodybootVigop.Snapstream), [employer] (www.Ryzing), The Exercise  (www.exercisecoach.Snapstream)     Online Fitness  Fitness  on NavTech  Fit in 10 DVD series- www.ndftb20FMI.com  Sit and Be Fit - Chair exercise series Www.sitandbefit.org  Hip Hop Fit with Sumit Greer at www.hiphopfit.net     Apps for on the Go Fitness  [place] 7 Minute Workout (orange box with white 7) - free on the go HIIT training delfina  Luis Delfina @ www.onepeloton.com     Nutrition Trackers and Tools  LoseIT!  And My Fitness Pal apps and on line for tracking nutrition  NOOM - virtual health coaching  FitFoundation (healthy meals on the go) in [place] @ www.esqzlvpokpffb8u.Greenleaf Book [employer]  Yvette SCHAEFER @ www.bistromd.com and Juzmta33 (keto and low carb plans recommended) @ www.dkoyoe93.com, Metabolic Meals @ www.YuDoGlobalMetabolicMeals.com - individual prepared meals to go  Gobble, Blue Apron, Home , Every Plate, Sunbasket- on line meal delivery programs for preparation at home  Meal Village in [place] for homemade meals to go @ www.mealvillage.[employer] Group  Diet Doctor @ www.dietdoctor.com - low carb swaps  YummEvomail - meal prep and planning babita (www.yummly.com)     Stress Management/Behavior/Mindful Eating  CALM meditation babita (www.calm.com)  Headspace  Am I Hungry? Mindful eating virtual  babita  Www.yourweightmatters.org - Obesity Action Coalition sponsored Blog posts daily  Motivation babita (black box with white \")- daily supportive messages sent to your phone     Books/Video Education/Podcasts  Mindless Eating by Chidi Bey  Why We Get Sick by Bulmaro Armenta (a book about insulin resistance)  Atomic Habits by Tulio Johns (a book about taking small steps to promote greater behavior change)   Can't Hurt Me by Anil Carrasco (a book exploring the power of discipline in achieving your goals)  The End of Dieting: How to Live for Life by Dr. Shan Caballero M.D. or listen to The [employer] Podcast Episode 63: Understanding \"Nutritarian\" Eating w/Dr. Shan Caballero  Your Body in Balance: The New Science of Food, Hormones, and Health by Dr. Emerson Kessler  The Menopause Diet Plan by Marielle Aggarwal and Rubi Mcelroy  The Complete Guide to fasting by Dr. Morrow  Sugar, Salt & Fat by Astrid Sadler, Ph.D, R.D.  Weight Loss Surgery Will Not Treat Food Addiction by Lisa Inman Ph.D  The Game Changers- Netflix Documentary on plant based nutrition  Fed Up - documentary about obesity (Free on Utube)  The Truth About Sugar - documentary on sugar (Free on  Sarah, https://youtu.be/0D2iuryAG5u)  The Dr. Tinsley T5 Wellness Plan by Dr. Andrea Tinsley MD  Fitlosophy Fitspiration - journal to better health (found at [employer] in fitness aisle)  What Happened to You?- a look at the impact trauma has on behavior written by Homar Allen and Dr. Mahesh Keith  Whole Again by Neil Rivers - discovering your true self after trauma  Franky Correia talk on [employer], The Call to Courage  Podcasts: The Exam Room by the Physician's Committee, Nutrition Facts by Dr. Gutierrez    We are here to support you with weight loss, but please remember that you still need your primary care provider for your routine health maintenance.      No follow-ups on file.    Patient verbalizes understanding.    PANFILO Pickard             [1]   Current Outpatient Medications on File Prior to Visit   Medication Sig Dispense Refill    Levonorgestrel-Ethinyl Estrad (VIENVA) 0.1-20 MG-MCG Oral Tab Take 1 tablet by mouth daily. SKIP PLACEBO 84 tablet 0    levothyroxine 25 MCG Oral Tab Take 1 tablet (25 mcg total) by mouth before breakfast. 90 tablet 1    Sulfacetamide Sodium, Acne, 10 % External Lotion APPLY A PEA SIZE AMOUNT TO FACE ONCE DAILY IN HE MORNING AFTER WASHING FACE      tretinoin 0.025 % External Cream APPLY PEA SIZED AMOUNT TO FACE EVERY OTHER NIGHT UNTIL TOLERATED EVERY NIGHT AT LEAST 1 TO 2 HOURS BEFORE BEDTIME      fluticasone propionate 50 MCG/ACT Nasal Suspension 1 spray by Each Nare route in the morning and 1 spray before bedtime. 18.2 mL 3    albuterol (PROAIR HFA) 108 (90 Base) MCG/ACT Inhalation Aero Soln Use 1-2 puffs every 4-6 hours or before exercise. 18 g 1     No current facility-administered medications on file prior to visit.

## 2024-10-23 NOTE — PATIENT INSTRUCTIONS
Next steps:  1.  Fill your prescribed medication and take as discussed and prescribed: phentermine 15mg (take 1 tablet by mouth daily   2.  Schedule a personal nutrition consultation with one of our registered dieticians  3.  Check with insurance on coverage for GLP-1 meds (ie. Wegovy or Zepbound)      Please try to work on the following dietary changes:  Daily protein recommendation to start:  grams  Daily carbohydrate: <160g  Daily calories: 1,900  1.  Drink water with meals and throughout the day, cut down on soda and/or juice if consumed. Consider flavored water options like Bubbly, Spindrift, Hint and Tasneem.  2.  Eat breakfast daily and focus on having protein with each meal, examples include: greek yogurt, cottage cheese, hard boiled egg, whole grain toast with peanut butter.   3.  Reduce refined carbohydrates and sugars which includes items such as sweets, as well as rice, pasta, and bread and make sure to choose whole grain options when having them with just 1 serving per meal about the size of your inner palm.  4.  Consume non starchy veggies daily working towards making them a good 50% of your daily food intake. Add them to lunch and dinner consistently.  5.  Start a daily probiotic: VSL#3 is recommended, (order on line at www.vsl3.com). Take 1 capsule daily with water for 30 days, then reduce to 1 every other day (this will reduce the cost). Capsules can be left out for 2 weeks, but then must be refrigerated.      Please download babita My Fitness Pal, LoseIt! Or Net Diary to monitor daily dietary intake and you will be able to see if you are eating the right amount of calories or too much or too little which would hinder weight loss. Additionally this will help to see your daily carbohydrate and protein intake. When you set the babita up choose 1-2 lbs/week as a goal.  Keeping a paper food journal is an option as well to remain accountable for your choices- this is the start to mindful eating! A low  calorie diet has been consistently shown to support weight loss.     Continue or start exercising to help establish a routine. If not already exercising begin with 1 day and progress as able with long-term goal of 30 minutes 5 days a week at a minimum.     Meditation daily can help manage and control stress. Chronic stress can make weight loss difficult.  Exercising is one way to help with stress, but meditation using the CALM Deflina or another comparable alternative can be done in your home or place of work with little time commitment. This Delfina can also help work on behavior change and improve sleep. Check out the segment under Calm Masterclass and listen to The 4 Pillars of Health. A great way to begin learning about the foundation of lifestyle with practical tips to use in your every day.     Check out www.yourweightmatters.org blog for continued daily support and education along this weight loss journey!    Patient Resources:     Personal Training/Fitness Classes/Health Coaching     [facility] @ [email or web address]-center Full fitness center with group fitness and personal training. Discount available as client of [employer] [facility].  Health Coaching and Personal Training with Charlotte Edwards at our [facility]- individual weekly coaching with option to add personal training and small group fitness classes targeted at weight loss- [phone] and/or email @ [email or web address]  360FIT [place] http://www.[facility] [employer] [facility]. Group Fitness [phone] and/or email Jenifer at [email or web address]  [facility] Fitness Centers with multiple locations: Cal[employer]us (www.Kublax), Eat The Frog Fitness (www.Appiphany.Welltheon), Fit Body Bootcamp (www.Zarbee'sbodybootSensiotecp.Welltheon), [employer] Media Fitness (www.PSS Systems), The Exercise  (www.exercisecoach.Welltheon)     Online Fitness  Fitness  on Utube  Fit  in 10 DVD series- www.bziqf50MHU.com  Sit and Be Fit - Chair exercise series Www.sitandbefit.org  Hip Hop Fit with Sumit Greer at www.hiphopfit.net     Apps for on the Go Fitness  [place] 7 Minute Workout (orange box with white 7) - free on the go HIIT training delfina  Peloton Delfina @ www.onepeloton.com     Nutrition Trackers and Tools  LoseIT! And My Fitness Pal apps and on line for tracking nutrition  NOOM - virtual health coaching  FitFoundation (healthy meals on the go) in [place] @ www.fvzhhpkgdiyxj6q.Quikly  Bistro MD @ www.bistromd.com and Tcorgr12 (keto and low carb plans recommended) @ wwweXIthera Pharmaceuticalswdlppk85.com, Metabolic Meals @ www.[employer] Culture MediaMetabolicMeals.com - individual prepared meals to go  Gobble, Blue Apron, Home , Every Plate, Sunbasket- on line meal delivery programs for preparation at home  Meal Village in [place] for homemade meals to go @ www.mealvillage.Quikly  Diet Doctor @ www.dietdoctor.com - low carb swaps  Yummly - meal prep and planning delfina (www.yummly.com)     Stress Management/Behavior/Mindful Eating  CALM meditation delfina (www.calm.com)  Headspace  Am I Hungry? Mindful eating virtual  delfina  Www.yourweightmatters.org - Obesity Action Coalition sponsored Blog posts daily  Motivation delfina (black box with white \")- daily supportive messages sent to your phone     Books/Video Education/Podcasts  Mindless Eating by Chidi Bey  Why We Get Sick by Bulmaro Armenta (a book about insulin resistance)  Atomic Habits by Tulio Johns (a book about taking small steps to promote greater behavior change)   Can't Hurt Me by Anil Carrasco (a book exploring the power of discipline in achieving your goals)  The End of Dieting: How to Live for Life by Dr. Shan Caballero M.D. or listen to The [employer] Podcast Episode 63: Understanding \"Nutritarian\" Eating w/Dr. Shan Caballero  Your Body in Balance: The New Science of Food, Hormones, and Health by Dr. Emerson Kessler  The Menopause Diet Plan by Marielle Aggarwal and Rubi  Navi  The Complete Guide to fasting by Dr. Morrow  Sugar, Salt & Fat by Astrid Sadler, Ph.D, R.D.  Weight Loss Surgery Will Not Treat Food Addiction by Lisa Inman Ph.D  The Game Changers- Netflix Documentary on plant based nutrition  Fed Up - documentary about obesity (Free on Utube)  The Truth About Sugar - documentary on sugar (Free on Utube, https://youtu.be/9V8wutsNJ7o)  The Dr. Tinsley T5 Wellness Plan by Dr. Andrea Tinsley MD  Fitlosophy Fitspiration - journal to better health (found at [employer] in fitness aisle)  What Happened to You?- a look at the impact trauma has on behavior written by Homar Allen and Dr. Mahesh Keith  Whole Again by Neil Rivers - discovering your true self after trauma  Franky Correia talk on Netflix, The Call to Courage  Podcasts: The Exam Room by the Physician's Committee, Nutrition Facts by Dr. Gutierrez    We are here to support you with weight loss, but please remember that you still need your primary care provider for your routine health maintenance.

## 2024-11-08 NOTE — PROGRESS NOTES
Marlene Brasher is a 26year old female. S:  Patient presents today with the following concerns:  · Eyes itchy and red. · Staying with one of friends before moving into dorm at [facility]. Tristianpedro Wilkins Friend has [facility] and [place].  Never has had issues wi without status migrainosus, not intractable    Family History   Problem Relation Age of Onset   • Other (multiple sclerosis) Mother    • Cancer Maternal Grandfather         bladder   • Diabetes Maternal Grandfather    • Diabetes Paternal Grandfather Oral Tab 60 tablet 2     Sig: Take 1 tablet (25 mg total) by mouth 2 (two) times daily. • loratadine 10 MG Oral Tab 30 tablet 5     Sig: Take 1 tablet (10 mg total) by mouth daily.    • Olopatadine HCl 0.1 % Ophthalmic Solution 5 mL 2     Sig: Place 1 karmen <-- Click to add NO pertinent Past Medical History

## 2025-01-16 DIAGNOSIS — Z30.41 ENCOUNTER FOR SURVEILLANCE OF CONTRACEPTIVE PILLS: ICD-10-CM

## 2025-01-16 RX ORDER — LEVONORGESTREL AND ETHINYL ESTRADIOL 0.1-0.02MG
KIT ORAL
Qty: 84 TABLET | Refills: 5 | Status: SHIPPED | OUTPATIENT
Start: 2025-01-16

## 2025-01-16 NOTE — TELEPHONE ENCOUNTER
Refill request for:    Requested Prescriptions     Pending Prescriptions Disp Refills    FALMINA 0.1-20 MG-MCG Oral Tab [Pharmacy Med Name: Falmina 0.1-20 Mg-Mcg Tab Nort] 84 tablet 0     Sig: TAKE ONE TABLET BY MOUTH DAILY. SKIP PLACEBO        Last Prescribed Quantity Refills   10/07/2024 84 0     LOV 3/4/2024     Patient was asked to follow-up in: 1 year    Appointment due: March 2025    Appointment scheduled: Visit date not found    Medication not on protocol.     # 84 with 0 refills routed to Adela Chin DO for review

## 2025-03-10 DIAGNOSIS — E03.9 HYPOTHYROIDISM, UNSPECIFIED TYPE: ICD-10-CM

## 2025-03-10 DIAGNOSIS — Z00.00 ROUTINE HEALTH MAINTENANCE: Primary | ICD-10-CM

## 2025-03-10 DIAGNOSIS — E55.9 VITAMIN D DEFICIENCY: ICD-10-CM

## 2025-03-10 NOTE — TELEPHONE ENCOUNTER
Refill request for:    Requested Prescriptions     Pending Prescriptions Disp Refills    LEVOTHYROXINE 25 MCG Oral Tab [Pharmacy Med Name: Levothyroxine Sodium 25 Mcg Tab Lupi] 90 tablet 0     Sig: TAKE ONE TABLET BY MOUTH BEFORE BREAKFAST        Last Prescribed Quantity Refills   6/27/2024 90 0     LOV Visit date not found     Patient was asked to follow-up in: 1 year    Appointment due: March 2025    Appointment scheduled: Visit date not found    Medication not on protocol.     # 90 with 0 refills routed to Adela Chin DO for review

## 2025-03-11 RX ORDER — LEVOTHYROXINE SODIUM 25 UG/1
25 TABLET ORAL
Qty: 90 TABLET | Refills: 0 | Status: SHIPPED | OUTPATIENT
Start: 2025-03-11

## 2025-03-11 NOTE — TELEPHONE ENCOUNTER
Due for updated labs and follow-up appointment.  Please make sure if she knows to get these labs done before her next refill.  Should also schedule physical.

## 2025-03-26 ENCOUNTER — OFFICE VISIT (OUTPATIENT)
Dept: FAMILY MEDICINE CLINIC | Facility: CLINIC | Age: 25
End: 2025-03-26
Payer: COMMERCIAL

## 2025-03-26 VITALS
DIASTOLIC BLOOD PRESSURE: 60 MMHG | OXYGEN SATURATION: 99 % | WEIGHT: 260 LBS | SYSTOLIC BLOOD PRESSURE: 110 MMHG | HEIGHT: 66 IN | BODY MASS INDEX: 41.78 KG/M2 | TEMPERATURE: 98 F | HEART RATE: 100 BPM | RESPIRATION RATE: 20 BRPM

## 2025-03-26 DIAGNOSIS — J02.9 SORE THROAT: Primary | ICD-10-CM

## 2025-03-26 DIAGNOSIS — H92.03 OTALGIA OF BOTH EARS: ICD-10-CM

## 2025-03-26 PROCEDURE — 3074F SYST BP LT 130 MM HG: CPT | Performed by: NURSE PRACTITIONER

## 2025-03-26 PROCEDURE — 3078F DIAST BP <80 MM HG: CPT | Performed by: NURSE PRACTITIONER

## 2025-03-26 PROCEDURE — 87880 STREP A ASSAY W/OPTIC: CPT | Performed by: NURSE PRACTITIONER

## 2025-03-26 PROCEDURE — 99213 OFFICE O/P EST LOW 20 MIN: CPT | Performed by: NURSE PRACTITIONER

## 2025-03-26 PROCEDURE — 3008F BODY MASS INDEX DOCD: CPT | Performed by: NURSE PRACTITIONER

## 2025-03-26 NOTE — PROGRESS NOTES
CHIEF COMPLAINT:     Chief Complaint   Patient presents with    Ear Problem     Earache and sore throat x 3 days       HPI:   Sadia Dan is a 24 year old female who presents for sore throat and ear for  3 days. Patient reports sore throat, ear pain. Symptoms have been unchanged since onset.  Treating symptoms with nothing.      Current Outpatient Medications   Medication Sig Dispense Refill    Levonorgestrel-Ethinyl Estrad (FALMINA) 0.1-20 MG-MCG Oral Tab TAKE ONE TABLET BY MOUTH DAILY. SKIP PLACEBO 84 tablet 5    LEVOTHYROXINE 25 MCG Oral Tab TAKE ONE TABLET BY MOUTH BEFORE BREAKFAST 90 tablet 0    Phentermine HCl 15 MG Oral Cap Take 1 capsule (15 mg total) by mouth every morning. 30 capsule 3    Sulfacetamide Sodium, Acne, 10 % External Lotion APPLY A PEA SIZE AMOUNT TO FACE ONCE DAILY IN HE MORNING AFTER WASHING FACE      tretinoin 0.025 % External Cream APPLY PEA SIZED AMOUNT TO FACE EVERY OTHER NIGHT UNTIL TOLERATED EVERY NIGHT AT LEAST 1 TO 2 HOURS BEFORE BEDTIME      fluticasone propionate 50 MCG/ACT Nasal Suspension 1 spray by Each Nare route in the morning and 1 spray before bedtime. 18.2 mL 3    albuterol (PROAIR HFA) 108 (90 Base) MCG/ACT Inhalation Aero Soln Use 1-2 puffs every 4-6 hours or before exercise. 18 g 1      Past Medical History:    Anxiety state    Asthma (HCC)    exercise induced asthma    Environmental allergies    Hx of concussion    Migraines    Visual impairment    glasses      Past Surgical History:   Procedure Laterality Date    Other Bilateral 2010    ear surgery to remove earring    Other surgical history  01/27/2020    excision of inflamed sebaceous cyst         Social History     Socioeconomic History    Marital status: Single   Occupational History    Occupation: Student   Tobacco Use    Smoking status: Never     Passive exposure: Never    Smokeless tobacco: Never   Vaping Use    Vaping status: Never Used   Substance and Sexual Activity    Alcohol use: No    Drug use: No    Other Topics Concern    Caffeine Concern Yes     Comment: one cup coffee twice weekly    Exercise Yes     Comment: 3 days a week    Seat Belt Yes    Self-Exams No         REVIEW OF SYSTEMS:   GENERAL: normal appetite  SKIN: no rashes or abnormal skin lesions  HEENT: See HPI  LUNGS: See HPI  CARDIOVASCULAR: denies chest pain or palpitations   GI: denies N/V/C or abdominal pain      EXAM:   /60   Pulse 100   Temp 97.8 °F (36.6 °C)   Resp 20   Ht 5' 6\" (1.676 m)   Wt 260 lb (117.9 kg)   LMP  (LMP Unknown)   SpO2 99%   BMI 41.97 kg/m²   GENERAL: well developed, well nourished,in no apparent distress  SKIN: no rashes,no suspicious lesions  HEAD: atraumatic, normocephalic.  no tenderness on palpation of  sinuses  EYES: conjunctiva clear, EOM intact  EARS: TM's pearly, no bulging, no retraction,no fluid, bony landmarks visible  NOSE: Nostrils patent, no nasal discharge, nasal mucosa pink   THROAT: Oral mucosa pink, moist. Posterior pharynx is no erythematous. no exudates. Tonsils 1/4.    NECK: Supple, non-tender  LUNGS: clear to auscultation bilaterally, no wheezes or rhonchi. Breathing is non labored.  CARDIO: RRR without murmur  EXTREMITIES: no cyanosis, clubbing or edema  LYMPH:  pos anterior cervical lymphadenopathy.        ASSESSMENT AND PLAN:   Sadia Dan is a 24 year old female who presents with upper respiratory symptoms that are consistent with    ASSESSMENT:   Encounter Diagnoses   Name Primary?    Sore throat Yes    Otalgia of both ears        PLAN:   Comfort care as described in Patient Instructions  Educated on viral vs bacterial  Educated on supportive measures: ibuprofen/tylenol, hydration, zyrtec, flonase  Educated on s/s of worsening sx and when to seek higher level of care  Follow up with pcp if not improving      Meds & Refills for this Visit:  Requested Prescriptions      No prescriptions requested or ordered in this encounter     Risks, benefits, and side effects of medication  explained and discussed.    The patient indicates understanding of these issues and agrees to the plan.  The patient is asked to f/u with PCP if sx's persist or worsen.  There are no Patient Instructions on file for this visit.

## 2025-05-16 ENCOUNTER — LAB ENCOUNTER (OUTPATIENT)
Dept: LAB | Age: 25
End: 2025-05-16
Attending: FAMILY MEDICINE
Payer: COMMERCIAL

## 2025-05-16 DIAGNOSIS — Z00.00 ROUTINE HEALTH MAINTENANCE: ICD-10-CM

## 2025-05-16 DIAGNOSIS — E55.9 VITAMIN D DEFICIENCY: ICD-10-CM

## 2025-05-16 LAB
ALBUMIN SERPL-MCNC: 4.4 G/DL (ref 3.2–4.8)
ALBUMIN/GLOB SERPL: 1.4 {RATIO} (ref 1–2)
ALP LIVER SERPL-CCNC: 87 U/L (ref 52–144)
ALT SERPL-CCNC: 18 U/L (ref 10–49)
ANION GAP SERPL CALC-SCNC: 7 MMOL/L (ref 0–18)
AST SERPL-CCNC: 17 U/L (ref ?–34)
BASOPHILS # BLD AUTO: 0.04 X10(3) UL (ref 0–0.2)
BASOPHILS NFR BLD AUTO: 0.4 %
BILIRUB SERPL-MCNC: 0.6 MG/DL (ref 0.3–1.2)
BUN BLD-MCNC: 10 MG/DL (ref 9–23)
CALCIUM BLD-MCNC: 9.3 MG/DL (ref 8.7–10.6)
CHLORIDE SERPL-SCNC: 109 MMOL/L (ref 98–112)
CHOLEST SERPL-MCNC: 213 MG/DL (ref ?–200)
CO2 SERPL-SCNC: 24 MMOL/L (ref 21–32)
CREAT BLD-MCNC: 0.9 MG/DL (ref 0.55–1.02)
EGFRCR SERPLBLD CKD-EPI 2021: 92 ML/MIN/1.73M2 (ref 60–?)
EOSINOPHIL # BLD AUTO: 0.11 X10(3) UL (ref 0–0.7)
EOSINOPHIL NFR BLD AUTO: 1.2 %
ERYTHROCYTE [DISTWIDTH] IN BLOOD BY AUTOMATED COUNT: 13.4 %
EST. AVERAGE GLUCOSE BLD GHB EST-MCNC: 105 MG/DL (ref 68–126)
FASTING PATIENT LIPID ANSWER: YES
FASTING STATUS PATIENT QL REPORTED: YES
GLOBULIN PLAS-MCNC: 3.1 G/DL (ref 2–3.5)
GLUCOSE BLD-MCNC: 100 MG/DL (ref 70–99)
HBA1C MFR BLD: 5.3 % (ref ?–5.7)
HCT VFR BLD AUTO: 42.8 % (ref 35–48)
HDLC SERPL-MCNC: 52 MG/DL (ref 40–59)
HGB BLD-MCNC: 14.1 G/DL (ref 12–16)
IMM GRANULOCYTES # BLD AUTO: 0.02 X10(3) UL (ref 0–1)
IMM GRANULOCYTES NFR BLD: 0.2 %
LDLC SERPL CALC-MCNC: 151 MG/DL (ref ?–100)
LYMPHOCYTES # BLD AUTO: 2.47 X10(3) UL (ref 1–4)
LYMPHOCYTES NFR BLD AUTO: 27.4 %
MCH RBC QN AUTO: 30.8 PG (ref 26–34)
MCHC RBC AUTO-ENTMCNC: 32.9 G/DL (ref 31–37)
MCV RBC AUTO: 93.4 FL (ref 80–100)
MONOCYTES # BLD AUTO: 0.56 X10(3) UL (ref 0.1–1)
MONOCYTES NFR BLD AUTO: 6.2 %
NEUTROPHILS # BLD AUTO: 5.83 X10 (3) UL (ref 1.5–7.7)
NEUTROPHILS # BLD AUTO: 5.83 X10(3) UL (ref 1.5–7.7)
NEUTROPHILS NFR BLD AUTO: 64.6 %
NONHDLC SERPL-MCNC: 161 MG/DL (ref ?–130)
OSMOLALITY SERPL CALC.SUM OF ELEC: 289 MOSM/KG (ref 275–295)
PLATELET # BLD AUTO: 277 10(3)UL (ref 150–450)
POTASSIUM SERPL-SCNC: 4 MMOL/L (ref 3.5–5.1)
PROT SERPL-MCNC: 7.5 G/DL (ref 5.7–8.2)
RBC # BLD AUTO: 4.58 X10(6)UL (ref 3.8–5.3)
SODIUM SERPL-SCNC: 140 MMOL/L (ref 136–145)
TRIGL SERPL-MCNC: 59 MG/DL (ref 30–149)
TSI SER-ACNC: 1.59 UIU/ML (ref 0.55–4.78)
VIT D+METAB SERPL-MCNC: 28.5 NG/ML (ref 30–100)
VLDLC SERPL CALC-MCNC: 11 MG/DL (ref 0–30)
WBC # BLD AUTO: 9 X10(3) UL (ref 4–11)

## 2025-05-16 PROCEDURE — 80053 COMPREHEN METABOLIC PANEL: CPT

## 2025-05-16 PROCEDURE — 85025 COMPLETE CBC W/AUTO DIFF WBC: CPT

## 2025-05-16 PROCEDURE — 83036 HEMOGLOBIN GLYCOSYLATED A1C: CPT

## 2025-05-16 PROCEDURE — 36415 COLL VENOUS BLD VENIPUNCTURE: CPT

## 2025-05-16 PROCEDURE — 80061 LIPID PANEL: CPT

## 2025-05-16 PROCEDURE — 82306 VITAMIN D 25 HYDROXY: CPT

## 2025-05-16 PROCEDURE — 84443 ASSAY THYROID STIM HORMONE: CPT

## 2025-06-17 DIAGNOSIS — E03.9 HYPOTHYROIDISM, UNSPECIFIED TYPE: ICD-10-CM

## 2025-06-19 RX ORDER — LEVOTHYROXINE SODIUM 25 UG/1
25 TABLET ORAL
Qty: 90 TABLET | Refills: 3 | Status: SHIPPED | OUTPATIENT
Start: 2025-06-19

## 2025-06-19 NOTE — TELEPHONE ENCOUNTER
Refill passed per Clinic protocol.  Requested Prescriptions   Pending Prescriptions Disp Refills    LEVOTHYROXINE 25 MCG Oral Tab [Pharmacy Med Name: Levothyroxine Sodium 25 Mcg Tab Lupi] 90 tablet 0     Sig: TAKE ONE TABLET BY MOUTH ONE TIME DAILY before breakfast       Thyroid Medication Protocol Passed - 6/19/2025  8:34 AM        Passed - TSH in past 12 months        Passed - Last TSH value is normal     Lab Results   Component Value Date    TSH 1.594 05/16/2025    TSHT4 1.23 09/21/2018                 Passed - In person appointment or virtual visit in the past 12 mos or appointment in next 3 mos     Recent Outpatient Visits              2 months ago Sore throat    [facility], [place] [address], [place] Joanne Aggarwal APRN    Office Visit    7 months ago Therapeutic drug monitoring    [facility], [address] [place] [address] Adriana Carter APRN    Office Visit    1 year ago Therapeutic drug monitoring    [facility], [address] [place] [address] Adriana Carter APRN    Office Visit    1 year ago Well woman exam with routine gynecological exam    [facility] Adela Chin DO    Office Visit    2 years ago Hypothyroidism, unspecified type    [facility] Adela Chin,     Office Visit          Future Appointments         Provider Department Appt Notes    In 1 week Adriana Carter APRN [address] 3 mo f/u    In 1 week Adela Chin DO [facility] Following up on labs and current medications                    Passed - Medication is active on med list

## 2025-06-30 ENCOUNTER — OFFICE VISIT (OUTPATIENT)
Dept: INTERNAL MEDICINE CLINIC | Facility: CLINIC | Age: 25
End: 2025-06-30
Payer: COMMERCIAL

## 2025-06-30 VITALS
BODY MASS INDEX: 44.1 KG/M2 | DIASTOLIC BLOOD PRESSURE: 72 MMHG | OXYGEN SATURATION: 98 % | SYSTOLIC BLOOD PRESSURE: 114 MMHG | WEIGHT: 281 LBS | RESPIRATION RATE: 18 BRPM | HEART RATE: 82 BPM | HEIGHT: 67 IN

## 2025-06-30 DIAGNOSIS — E78.00 ELEVATED LDL CHOLESTEROL LEVEL: ICD-10-CM

## 2025-06-30 DIAGNOSIS — E66.01 OBESITY, MORBID, BMI 40.0-49.9 (HCC): ICD-10-CM

## 2025-06-30 DIAGNOSIS — F43.9 STRESS: ICD-10-CM

## 2025-06-30 DIAGNOSIS — Z51.81 THERAPEUTIC DRUG MONITORING: Primary | ICD-10-CM

## 2025-06-30 PROCEDURE — 3008F BODY MASS INDEX DOCD: CPT | Performed by: NURSE PRACTITIONER

## 2025-06-30 PROCEDURE — 3074F SYST BP LT 130 MM HG: CPT | Performed by: NURSE PRACTITIONER

## 2025-06-30 PROCEDURE — 3078F DIAST BP <80 MM HG: CPT | Performed by: NURSE PRACTITIONER

## 2025-06-30 PROCEDURE — 99214 OFFICE O/P EST MOD 30 MIN: CPT | Performed by: NURSE PRACTITIONER

## 2025-06-30 NOTE — PROGRESS NOTES
HISTORY OF PRESENT ILLNESS  Chief Complaint   Patient presents with    Weight Check     Up 13 lb 10/23/24     Sadia Dan is a 25 year old female here for follow up with medical weight loss program for the treatment of overweight, obesity, or morbid obesity.     Up #13 lbs lbs follow-up from 10/2024  Non-compliant on phentermine 15mg (ran out many months ago0   Tolerating well, helping with decreasing appetite and no side effects     Got a new job and has been working longer hours..  Admits that she does not have time to exercise, cannot make it to the grocery store or meal prep.  Has been eating out more frequently  Exercise/Activity: not doing anything routine as far as exercise- due to lack of time  Nutrition: skipping meals, +protein, minimal veggies. not tracking reports  Meals out per week on average: 10+  Stress is manageable  Sleep: 6.5-10 hours/night, waking up feeling rested    Denies chest pain, shortness of breath, dizziness, blurred vision, headache, paresthesia, nausea/vomiting.     Breakfast Lunch Dinner Snacks Fluids   Reviewed              Wt Readings from Last 6 Encounters:   06/30/25 281 lb (127.5 kg)   03/26/25 260 lb (117.9 kg)   10/23/24 268 lb (121.6 kg)   04/22/24 238 lb (108 kg)   03/04/24 226 lb (102.5 kg)   02/21/23 247 lb (112 kg)          REVIEW OF SYSTEMS  GENERAL: feels well otherwise, denied any fevers chills or night sweats   LUNGS: denies shortness of breath  CARDIOVASCULAR: denies chest pain  GI: denies abdominal pain  MUSCULOSKELETAL: denies back pain, joint pains   PSYCH: denies change in behavior or mood, denies feeling sad or depressed    EXAM  /72   Pulse 82   Resp 18   Ht 5' 7\" (1.702 m)   Wt 281 lb (127.5 kg)   LMP  (LMP Unknown)   SpO2 98%   BMI 44.01 kg/m²       GENERAL: well developed, well nourished, in no apparent distress, A/O x3  SKIN: no rashes, no suspicious lesions  HEENT: atraumatic, normocephalic, OP-clear, PERRLA  NECK: supple, no  adenopathy  LUNGS: CTA in all fields, breathing non labored  CARDIO: RRR without murmur  GI: +BS, NT/ND, no masses or HSM  EXTREMITIES: no cyanosis, no clubbing, no edema    Lab Results   Component Value Date     (H) 05/16/2025    BUN 10 05/16/2025    BUNCREA 8.3 (L) 10/04/2019    CREATSERUM 0.90 05/16/2025    ANIONGAP 7 05/16/2025    GFRNAA 108 12/06/2021    GFRAA 125 12/06/2021    CA 9.3 05/16/2025    OSMOCALC 289 05/16/2025    ALKPHO 87 05/16/2025    AST 17 05/16/2025    ALT 18 05/16/2025    BILT 0.6 05/16/2025    TP 7.5 05/16/2025    ALB 4.4 05/16/2025    GLOBULIN 3.1 05/16/2025     05/16/2025    K 4.0 05/16/2025     05/16/2025    CO2 24.0 05/16/2025     Lab Results   Component Value Date     05/16/2025    A1C 5.3 05/16/2025     Lab Results   Component Value Date    CHOLEST 213 (H) 05/16/2025    TRIG 59 05/16/2025    HDL 52 05/16/2025     (H) 05/16/2025    VLDL 11 05/16/2025    TCHDLRATIO 3.3 10/05/2011    NONHDLC 161 (H) 05/16/2025     Lab Results   Component Value Date    B12 325 04/27/2024     Lab Results   Component Value Date    VITD 28.5 (L) 05/16/2025       Medications Ordered Prior to Encounter[1]    ASSESSMENT/PLAN    ICD-10-CM    1. Therapeutic drug monitoring  Z51.81       2. Obesity, morbid, BMI 40.0-49.9 ([facility])  E66.01           PLAN   Initial Weight Data and Goal Weight Loss:  Initial consult: #238 lbs on 4/2024  Weight Calculations  Initial Weight: 238 lbs  Initial Weight Date: 04/22/24  Today's Weight: 281 lbs  5% Goal: 11.9  10% Goal: 23.8  Total Weight Loss: -43 lbs  Total weight loss: up #13 lbs total, Net loss +43 lbs  Will check with new insurance on coverage for GLP-1 ie wegovy or zepbound  Had ran out and stopped: phentermine   --advised of side effects and adverse effects of this medication  --Instructed to use contraception at all times while on AOMs  Contradictions: none  Reviewed labs  Continue with vitamin d OTC   Anxiety/depression,  stable  Hypothyroid- stable, continue with current medication regimen, managed by PCP   Stress is high, see HPI  Try to reduce eating out premade meals services given  Wrote out macros and encouraged tracking food  Nutrition: Low carb diet, recommended to eat breakfast daily/ regular protein intake  Follow up with dietitian and psychologist as recommended.  Discussed the role of sleep and stress in weight management.  Counseled on comprehensive weight loss plan including attention to nutrition, exercise and behavior/stress management for success. See patient instruction below for more details.  Discussed strategies to overcome barriers to successful weight loss and weight maintenance  FITTE: ACSM recommendations (150-300 minutes/ week in active weight loss)   Weight Loss Consent to treat reviewed and signed.    Total time spent on chart review, pre-charting, obtaining history, counseling, and educating, reviewing labs was 30 minutes.       NOTE TO PATIENT: The 21st Century Cures Act makes clinical notes like these available to patients in the interest of transparency. Clinical notes are medical documents used by physicians and care providers to communicate with each other. These documents include medical language and terminology, abbreviations, and treatment information that may sound technical and at times possibly unfamiliar. In addition, at times, the verbiage may appear blunt or direct. These documents are one tool providers use to communicate relevant information and clinical opinions of the care providers in a way that allows common understanding of the clinical context.     There are no Patient Instructions on file for this visit.    No follow-ups on file.    Patient verbalizes understanding.    PANFILO Pickard             [1]   Current Outpatient Medications on File Prior to Visit   Medication Sig Dispense Refill    levothyroxine 25 MCG Oral Tab Take 1 tablet (25 mcg total) by mouth before breakfast.  90 tablet 3    Levonorgestrel-Ethinyl Estrad (FALMINA) 0.1-20 MG-MCG Oral Tab TAKE ONE TABLET BY MOUTH DAILY. SKIP PLACEBO 84 tablet 5    Phentermine HCl 15 MG Oral Cap Take 1 capsule (15 mg total) by mouth every morning. 30 capsule 3    Sulfacetamide Sodium, Acne, 10 % External Lotion APPLY A PEA SIZE AMOUNT TO FACE ONCE DAILY IN HE MORNING AFTER WASHING FACE      tretinoin 0.025 % External Cream APPLY PEA SIZED AMOUNT TO FACE EVERY OTHER NIGHT UNTIL TOLERATED EVERY NIGHT AT LEAST 1 TO 2 HOURS BEFORE BEDTIME      fluticasone propionate 50 MCG/ACT Nasal Suspension 1 spray by Each Nare route in the morning and 1 spray before bedtime. 18.2 mL 3    albuterol (PROAIR HFA) 108 (90 Base) MCG/ACT Inhalation Aero Soln Use 1-2 puffs every 4-6 hours or before exercise. 18 g 1     No current facility-administered medications on file prior to visit.

## 2025-06-30 NOTE — PATIENT INSTRUCTIONS
Next steps:  1.  check with insurance on coverage for GLP-1 (ie. Wegovy or zepbound)   2.  Continue with meeting with dietitian as directed    3.  Use contraception at all times while on anti-obesity medications.  4. Try out some pre-made meal options: charles gasca MD, metabolic meals, Factor 75, Freshly       Please try to work on the following dietary changes:  Daily protein recommendation to start:  grams  Daily carbohydrate:  <160g  Daily calories: 1,900-2,000  1.  Drink water with meals and throughout the day, cut down on soda and/or juice if consumed. Consider flavored water options like Bubbly, Spindrift, Hint and Tasneem. Reduce alcohol servings to 4 per week maximum.  2.  Have protein with each meal, examples include: greek yogurt, cottage cheese, hard boiled egg, tofu, chicken, fish, or tuna. Recommended daily protein intake is 100 grams/day.   3.  Work towards reducing/eliminating refined carbohydrates and sugars which includes items such as sweets, as well as rice, pasta, and bread and make sure to choose whole grain options when having them with just 1 serving per meal about the size of your inner palm.  4.  Consume non starchy veggies daily working towards making them a good 50% of your daily food intake. Add them to lunch and dinner consistently.  5.  Start a daily probiotic: VSL#3 is recommended, (order on line at www.vsl3.com). Take 1 capsule daily with water for 30 days, then reduce to 1 every other day (this will reduce the cost). Capsules can be left out of refrigerator for 2 weeks. I recommend using a pill box weekly and keeping the bottle in the fridge.     Please download babita My Fitness Pal, LoseIt! Or My Net Diary to monitor daily dietary intake and you will be able to see if you are eating the right amount of calories or too much or too little which would hinder weight loss. Additionally this will help to see your daily carbohydrate and protein intake. When you set the babita up  choose 1.5 lbs/week as a goal.  Keeping a paper food journal is an option as well to remain accountable for your choices- this is the start to mindful eating! A low calorie diet has been consistently shown to support weight loss.     Continue or start exercising to help establish a routine. If not already exercising begin with 1 day/week and progress as able with the goal of working towards 30 minutes 5 days a week at a minimum. A variety or cardio, strength and stretching is important. Review resources below to help support you in building this healthy routine.     Meditation daily can help manage and control stress. Chronic stress can make weight loss difficult.  Exercising is one way to help with stress, but meditation using the CALM Delfina or another comparable alternative can be done in your home or place of work with little time commitment. This Delfina can also help work on behavior change and improve sleep. Check out the segment under Calm Masterclass and listen to The 4 Pillars of Health. A great way to begin learning about the foundation of lifestyle with practical tips to use in your every day. In addition, we offer counseling services and support for individual connection and care. A referral is necessary so please let me know if this is a service you are interested.     Check out www.yourweightmatters.org blog for continued support and education along your weight loss journey to optimal health!  Patient Resources:     Personal Training/Fitness Classes/Health Coaching     [facility] in [place]: Full fitness center with group fitness and personal training located in [place].  Health Coaching with Charlotte Edwards, Iván Cisneros, and Mick Thurston at our [facility]- individual coaching to work on your health goals. Call [phone] and/or email @ [email or web address]. Free 60 minute consult when client of [facility].  JOSEPH Marc @  http://www.BlastRoots. A variety of group fitness options plus various yoga classes [phone] and/or email Jenifer at [email or web address]  [facility] Centers with multiple locations: Focus Media (www.Paracor Medical), F45 Training (www.f79shjldjhj.KIDOZ), T[employer] Body Bootcamp (www.Wetradetogetherp.KIDOZ), Cell Therapy (www.Acacia Interactive), The Exercise  (www.exercisecoach.com), Club Pilates (www.clubMatchpoint Careers.KIDOZ)     Online Fitness  Fitness  on Doculogy  Fit in 10 DVD series                              www.Yesweplay  Chair exercises via Sit and Be Fit (www.sitandbefit.org) and [employer] (www.Support Your App.com) or Dylan Cervantes or Gael Gr videos on YouTube.  Hip Hop Fit with Allclasses at www.hiphopfit.Traackr     Apps for on the Go Fitness  WOWash 7 Minute Workout (orange box with white 7) - free on the go HIIT training delfina  Peloton Delfina @ www.onepeloton.com     Nutrition Trackers, Meal Preparation, and Other Meal Programs  LoseIT! And My Fitness Pal apps and on line for tracking nutrition  NOOM - virtual health coaching  FitFoundation (healthy meals on the go) in [place] @ www.rwbataqdasmad1u.KIDOZ  Yvette SCHAEFER @ FastmobilebistrJFrogd.KIDOZ and Jtlwgq50 (calorie smart and low carb plans recommended) @ www.nsbwcr97.com, Metabolic Meals @ www.MyMetabolicMeals.com - individual prepared meals to go  Gobble, Blue Apron, Home , Every Plate, Sunbasket- on line meal delivery programs for preparation at home  Meal Village in [place] for homemade meals to go @ www.mealvillage.KIDOZ  Diet Doctor @ www.dietdoctor.com - low carb swaps  ReciMe and Mealime delfina (grocery and meal planning)     Stress, Anxiety, Depression, Trauma  CALM meditation delfina (www.calm.com)  Headspace  Don't let anxiety run your life. Using the science of emotion regulation and mindfulness to overcome fear and worry by Anil Mart PsyD and Madhu Park MA.  The iFollo Podcast  (September 27, 2023): 6 Magic Words That Stop Anxiety  What Happened to You?- a look at the impact trauma has on behavior written by Homar Allen and Dr. Mahesh Keith  Whole Again by Neil Rivers - discovering your true self after trauma     Mindful Eating/The Hungry Brain  Am I Hungry? Mindful eating virtual  babita (www.amihungry.com)  The Hungry Brain by Misty Obando, PhD  Mindless Eating by Chidi Bey  Weight Loss Surgery Will Not Treat Food Addiction by Lisa Inman Ph.D     Metabolic Dysfunction, Hormones and Cravings  Why We Get Sick? By Bulmaro Armenta (insulin resistance)  Your Body in Balance: The New Science of Food, Hormones, and Health by Dr. Emerson Kessler  The Complete Guide to fasting by Dr. Morrow  Fast Like a Girl by Dr. Tara Rojas  The M Factor (documentary on PBS about Menopause)  Sugar, Salt & Fat by Astrid Sadler, Ph.D, R.D.  The Truth About Sugar - documentary on sugar (Free on The Luxe Nomad, https://youCloudTagsu.be/4Z7kfyeUW5g)  Presentation on SUGAR called Sugar: The Bitter Truth by Dr. Cuate Hernandez (The Luxe Nomad) https://[employer] Partners.be/dBnniua6-oM?si=[email or web address]  Reverse Visceral Fat: #1 Way to Increase Your Lifespan & End Inflammation with Dr. Thomas Heredia on Utube @ https://Accuhealth Partners.be/nupPRnvUpJY?si=bp2vyfJkBOX5QxxI     Nutrition Support  You Are What You Eat - Netfix series on twin study looking at impact of nutrition changes on health  The End of Dieting: How to Live for Life by Dr. Shan Caballero M.D. or listen to The Synchronized Podcast Episode 63: Understanding \"Nutritarian\" Eating w/Dr. Shan Caballero  The Game Changers- Netflix Documentary on plant based nutrition  The Dr. Tinsley T5 Wellness Plan by Dr. Andrea Tinsley MD  The Complete Guide to fasting by Dr. Morrow  @[facility] (InstTwin City Hospitalam Dietician with support surrounding nutrition and meal prep/planning)     Education, Motivation and Support Resources  Live to 100: Secrets of the Blue Zones - Netflix series on the secrets to communities living  over 100 years old  Atomic Habits by Tulio Johns (a book about taking small steps to promote greater behavior change)   Motivation babita (black box with white \")- daily supportive messages sent to your phone  Can't Hurt Me by Anil Carrasco (a book exploring the power of discipline in achieving your goals)  Fed Up - documentary about obesity (Free on Utube)  Www.yourweightmatters.org - Obesity Action Coalition sponsored Blog posts  Obesity Action Coalition Resources on topics specific to weight management (www.obesityaction.org)  Fitlosophy Fitspiration - journal to better health (journal book found at [employer] in fitness aisle)  Franky Correia talk titled: The Call to Courage (Netflix)  The Exam Room by the Physician's Committee (Podcast)  Nutrition Facts by Dr. Gutierrez (Podcast)

## 2025-07-01 ENCOUNTER — OFFICE VISIT (OUTPATIENT)
Dept: FAMILY MEDICINE CLINIC | Facility: CLINIC | Age: 25
End: 2025-07-01
Payer: COMMERCIAL

## 2025-07-01 VITALS
DIASTOLIC BLOOD PRESSURE: 70 MMHG | SYSTOLIC BLOOD PRESSURE: 110 MMHG | BODY MASS INDEX: 44 KG/M2 | WEIGHT: 280.38 LBS | RESPIRATION RATE: 16 BRPM | HEART RATE: 88 BPM

## 2025-07-01 DIAGNOSIS — G43.709 CHRONIC MIGRAINE WITHOUT AURA WITHOUT STATUS MIGRAINOSUS, NOT INTRACTABLE: ICD-10-CM

## 2025-07-01 DIAGNOSIS — E55.9 VITAMIN D DEFICIENCY: ICD-10-CM

## 2025-07-01 DIAGNOSIS — Z23 NEED FOR VACCINATION: ICD-10-CM

## 2025-07-01 DIAGNOSIS — E66.01 MORBID OBESITY WITH BMI OF 40.0-44.9, ADULT (HCC): ICD-10-CM

## 2025-07-01 DIAGNOSIS — E03.9 HYPOTHYROIDISM, UNSPECIFIED TYPE: ICD-10-CM

## 2025-07-01 DIAGNOSIS — E78.2 MODERATE MIXED HYPERLIPIDEMIA NOT REQUIRING STATIN THERAPY: ICD-10-CM

## 2025-07-01 DIAGNOSIS — Z00.00 WELL WOMAN EXAM WITHOUT GYNECOLOGICAL EXAM: Primary | ICD-10-CM

## 2025-07-01 PROCEDURE — 90715 TDAP VACCINE 7 YRS/> IM: CPT | Performed by: FAMILY MEDICINE

## 2025-07-01 PROCEDURE — 99214 OFFICE O/P EST MOD 30 MIN: CPT | Performed by: FAMILY MEDICINE

## 2025-07-01 PROCEDURE — 90471 IMMUNIZATION ADMIN: CPT | Performed by: FAMILY MEDICINE

## 2025-07-01 PROCEDURE — 99395 PREV VISIT EST AGE 18-39: CPT | Performed by: FAMILY MEDICINE

## 2025-07-01 PROCEDURE — 3078F DIAST BP <80 MM HG: CPT | Performed by: FAMILY MEDICINE

## 2025-07-01 PROCEDURE — 3074F SYST BP LT 130 MM HG: CPT | Performed by: FAMILY MEDICINE

## 2025-07-01 NOTE — PROGRESS NOTES
SUBJECTIVE:  Chief Complaint   Patient presents with    Follow - Up     Labs and current med     HPI:  History of Present Illness  Sadia Dan is a 25 year old female who presents for an annual physical and medication check.    She has a history of hypothyroidism, managed with levothyroxine 25 micrograms daily. Her thyroid function tests are within normal limits, indicating effective control of the condition.    She experiences migraines but is not on any regular medication for them. She uses Fioricet for acute episodes but has not needed a refill recently, suggesting her migraines are well-controlled.    Recent lab work showed a slightly elevated total cholesterol at 213 mg/dL, with LDL at 151 mg/dL. She does not have a known family history of early heart disease or cholesterol issues.    Her vitamin D levels were slightly low, and she had previously been on a weekly supplement. She has not been taking it recently and is considering starting a daily supplement.    She previously tried phentermine for weight management but did not notice significant effects on appetite suppression. She is considering other options but has not made a decision yet.    She is due for a tetanus booster. No concerns about depression or anxiety. No family history of colon or breast cancer.    Health Maintenance:  Vaccines: reviewed as below. Indicated today: Tdap  Immunization History   Administered Date(s) Administered    Covid-19 Vaccine Moderna 100 mcg/0.5 ml 01/10/2022    Covid-19 Vaccine Pfizer 30 mcg/0.3 ml 05/18/2021, 06/08/2021    DTAP 08/27/2001, 10/22/2001, 01/03/2002, 10/03/2002, 09/30/2005    HEP A,Ped/Adol,(2 Dose) 09/13/2011, 07/21/2012    HEP B, Ped/Adol 08/27/2001, 10/22/2001, 03/26/2002    HPV (Gardasil) 11/11/2014, 03/14/2015, 06/29/2015    Hib, Unspecified Formulation 08/27/2001, 10/22/2001, 01/03/2002, 10/03/2002    IPV 08/27/2001, 10/22/2001, 10/03/2002, 09/30/2005    Influenza 09/13/2011    MMR 10/03/2002,  07/19/2006    Meningococcal-Menactra 07/21/2012, 02/23/2018    Pneumococcal (Prevnar 7) 08/27/2001, 10/22/2001, 01/03/2002    TDAP 07/21/2012, 07/01/2025    Varicella 10/03/2002, 05/06/2008     Obesity screening: Body mass index is 43.92 kg/m².  Diabetes screening:    Lab Results   Component Value Date     05/16/2025    A1C 5.3 05/16/2025      Hypercholesterolemia screening:   Lab Results   Component Value Date    HDL 52 05/16/2025    HDL 51 03/09/2024    HDL 58 01/28/2023     Lab Results   Component Value Date     (H) 05/16/2025     (H) 03/09/2024     (H) 01/28/2023     Lab Results   Component Value Date    TRIG 59 05/16/2025    TRIG 60 03/09/2024    TRIG 43 01/28/2023        Depression screen:  no concerns  Osteoporosis: No history of pathologic fractures. No steroid use, smoking, risk of falls, excessive alcohol use.  Cervical Cancer screening: Last Pap: 03/04/2024     Colon Cancer screening: Family history of colon cancer? no Last colonoscopy: -   Breast Cancer screening: Family history of breast cancer? no Last mammogram: -   STI: Desires testing for STIs? no  Tobacco use:  reports that she has never smoked. She has never been exposed to tobacco smoke. She has never used smokeless tobacco.    ROS: Negative unless stated above    HISTORY:  Past Medical History[1]   Past Surgical History[2]   Family History[3]   Short Social Hx on File[4]     Allergies:  Allergies[5]    OBJECTIVE:  PHYSICAL EXAM:  Vitals:    07/01/25 1724   BP: 110/70   Pulse: 88   Resp: 16   Weight: 280 lb 6.4 oz (127.2 kg)     Physical Examination: General appearance - alert, well appearing, and in no distress and overweight  Mental status - alert, oriented to person, place, and time, normal mood, behavior, speech, dress, motor activity, and thought processes  Ears - bilateral TM's and external ear canals normal  Neck - supple, no significant adenopathy  Chest - clear to auscultation, no wheezes, rales or rhonchi,  symmetric air entry  Heart - normal rate, regular rhythm, normal S1, S2, no murmurs, rubs, clicks or gallops  Abdomen - soft, nontender, nondistended, no masses or organomegaly  Extremities - peripheral pulses normal, no pedal edema, no clubbing or cyanosis    Results  LABS  Total cholesterol: 213 mg/dL  HDL: 52 mg/dL  Triglycerides: 59 mg/dL  LDL: 151 mg/dL  Vitamin D: 28 ng/mL    ASSESSMENT & PLAN:  Sadia Dan is a 25 year old female is here for Follow - Up (Labs and current med)    1. Well woman exam without gynecological exam (Primary)  2. Need for vaccination  -     Immunization Admin Counseling, 1st Component, 18 years and older  -     TdaP (Boostrix/Adacel) Vaccine (> 7 Y)  3. Chronic migraine without aura without status migrainosus, not intractable  -     Butalbital-APAP-Caffeine; Take 1 tablet by mouth every 4 (four) hours as needed for Pain.  Dispense: 30 tablet; Refill: 1  4. Hypothyroidism, unspecified type  5. Moderate mixed hyperlipidemia not requiring statin therapy  6. Vitamin D deficiency  7. Morbid obesity with BMI of 40.0-44.9, adult (HCC)    Assessment & Plan  Migraine  Experiences migraines but manages well without regular medication. Fioricet is available for acute episodes. A refill is requested.  - Prescribe Fioricet for acute migraine management.    Hypothyroidism  Well-managed with levothyroxine 25 mcg. Thyroid function tests confirm current dosage is effective.  - Continue levothyroxine 25 mcg daily.  - Prescribe a year's supply of levothyroxine.    Hyperlipidemia  Elevated total cholesterol at 213 mg/dL and LDL at 151 mg/dL. No family history of early heart disease or cholesterol issues. Lifestyle modifications recommended to manage cholesterol levels.  - Advise regular exercise.  - Recommend dietary modifications to reduce intake of processed foods, fast foods, and animal products.  - Increase dietary fiber intake with green vegetables, whole grains, oats, quinoa, and  rice.    Vitamin D deficiency  Vitamin D level slightly below normal at 28 ng/mL. Previous weekly supplementation improved levels, but daily supplementation is now recommended to maintain adequate levels.  - Start daily vitamin D supplementation at 4000 IU.    Weight management  Discussed weight management options including phentermine and GLP-1 agonists. Phentermine previously trialed without significant effect. GLP-1 agonists may offer better weight loss but require consideration of long-term commitment and cost.  - Consider GLP-1 agonist for weight management if phentermine is ineffective.  - Evaluate insurance coverage and cost for GLP-1 agonist.    General Health Maintenance  Routine health maintenance discussed, including vaccinations and screenings. Tetanus vaccine is due.  - Administer Tdap vaccine today.  - Schedule next Pap smear for 2027.    Recording duration: 16 minutes    Call or return to clinic prn if these symptoms worsen or fail to improve as anticipated. RTC in 1 year.   Adela Chin DO  7/1/2025 5:50 PM    Note to Patient  The 21st Century Cures Act makes medical notes like these available to patients in the interest of transparency. However, be advised this is a medical document and is intended as isoy-oy-vkov communication; it is written in medical language and may appear blunt, direct, or contain abbreviations or verbiage that are unfamiliar. Medical documents are intended to carry relevant information, facts as evident, and the clinical opinion of the practitioner.     This report has been produced using speech recognition software and AI generated text, and may contain errors related to grammar, punctuation, spelling, words or phrases unrecognized or not translated appropriately to text; these errors may be referred to the dictating provider for further clarification and/or addendum as needed.         [1]   Past Medical History:   Anxiety state    Asthma (HCC)    exercise induced asthma     Environmental allergies    Hx of concussion    Migraines    Visual impairment    glasses   [2]   Past Surgical History:  Procedure Laterality Date    Other Bilateral 2010    ear surgery to remove earring    Other surgical history  01/27/2020    excision of inflamed sebaceous cyst   [3]   Family History  Problem Relation Age of Onset    Alcohol and Other Disorders Associated Mother     Personality Disorder Mother     Other (multiple sclerosis) Mother     Cancer Maternal Grandfather         bladder    Diabetes Maternal Grandfather     Diabetes Paternal Grandfather     Bipolar Disorder Paternal Grandfather     Alcohol and Other Disorders Associated Paternal Grandfather     Bipolar Disorder Father     Alcohol and Other Disorders Associated Father     Personality Disorder Father     Bipolar Disorder Half-Sister     Alcohol and Other Disorders Associated Paternal Uncle     Bipolar Disorder Paternal Uncle     Bipolar Disorder Paternal Uncle     Alcohol and Other Disorders Associated Paternal Uncle     Bipolar Disorder Paternal Uncle     Alcohol and Other Disorders Associated Paternal Uncle    [4]   Social History  Socioeconomic History    Marital status: Single   Occupational History    Occupation: Student   Tobacco Use    Smoking status: Never     Passive exposure: Never    Smokeless tobacco: Never   Vaping Use    Vaping status: Never Used   Substance and Sexual Activity    Alcohol use: No    Drug use: No   Other Topics Concern    Caffeine Concern Yes     Comment: one cup coffee twice weekly    Exercise Yes     Comment: 3 days a week    Seat Belt Yes    Self-Exams No   [5]   Allergies  Allergen Reactions    Penicillins DIZZINESS, JITTERY and HIVES     Blurred Vision    Wheat FATIGUE and NAUSEA AND VOMITING    Seasonal Runny nose

## 2025-07-01 NOTE — PROGRESS NOTES
The following individual(s) verbally consented to be recorded using ambient AI listening technology and understand that they can each withdraw their consent to this listening technology at any point by asking the clinician to turn off or pause the recording:    Patient name: Sadia Chengles

## 2025-07-06 RX ORDER — BUTALBITAL, ACETAMINOPHEN AND CAFFEINE 50; 325; 40 MG/1; MG/1; MG/1
1 TABLET ORAL EVERY 4 HOURS PRN
Qty: 30 TABLET | Refills: 1 | Status: SHIPPED | OUTPATIENT
Start: 2025-07-06

## (undated) DIAGNOSIS — Z30.41 ENCOUNTER FOR SURVEILLANCE OF CONTRACEPTIVE PILLS: ICD-10-CM

## (undated) DEVICE — SUTURE VICRYL 2-0

## (undated) DEVICE — MINI LAP PACK-LF: Brand: MEDLINE INDUSTRIES, INC.

## (undated) DEVICE — SUTURE VICRYL 3-0 SH

## (undated) DEVICE — SUTURE VICRYL 4-0 PC-1

## (undated) DEVICE — CHLORAPREP 26ML APPLICATOR

## (undated) DEVICE — KENDALL SCD EXPRESS SLEEVES, KNEE LENGTH, MEDIUM: Brand: KENDALL SCD

## (undated) DEVICE — DRAPE HALF 40X58 DYNJP2410

## (undated) DEVICE — GAMMEX® NON-LATEX PI TEXTURED SIZE 7.5, STERILE POLYISOPRENE POWDER-FREE SURGICAL GLOVE: Brand: GAMMEX

## (undated) DEVICE — SOL  .9 1000ML BTL
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
## (undated) NOTE — LETTER
Date: 1/15/2018    Patient Name: Ya Art          To Whom it may concern:    Ayaz Rosario is currently under my care. Please excuse her from work through 1/21/18 for her current medical condition. She will be reevaluated at this time.       Sincerely,
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
## (undated) NOTE — LETTER
Date: 3/10/2022    Patient Name: Zayda Hudson          To Whom it may concern: This letter has been written at the patient's request. The above patient is followed at the [facility] for treatment of migraines. Please excuse her for the time she has missed due to migraines. She has a follow up on 3/22/2022 to discuss changing treatment to avoid future absences.          Sincerely,        Felton Harrington, DO

## (undated) NOTE — LETTER
Date: 1/10/2018    Patient Name: Laura Padron          To Whom it may concern:    Teodora Logan is currently under my care for a medical condition. Please excuse her from work through 1/14/18 for this condition.         Sincerely,        Brenden Armijo, DO

## (undated) NOTE — LETTER
January 3, 2018    Patient: Ryan Fillers   Date of Visit: 1/3/2018       To Whom It May Concern:    Sunny Carter was seen and treated in our emergency department on 1/3/2018.  She may return to school or work with restrictions of no PE or sports f

## (undated) NOTE — LETTER
Date: 1/22/2018    Patient Name: Manuel Fan          To Whom it may concern:    Please excuse Ashley Titus from work through 1/28/18 for her current medical condition.       Sincerely,        Vladimir Mares, DO

## (undated) NOTE — Clinical Note
I had the pleasure of seeing Priya Todd on 1/8/2020. Please see my attached note. Kristina Amin MD [facility]--Surgery

## (undated) NOTE — LETTER
Date: 9/13/2022    Patient Name: Chantale Florez          To Whom it may concern:    Chetna Rosenthal is under my care. She currently suffers from frequent headaches which at times are more severe and impact her ability to function at work. Symptoms of severe headaches usually last about a day. Please allow patient accommodations/time off for these severe headaches when they occur.          Sincerely,        Elba Ovalle, DO
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
## (undated) NOTE — ED AVS SNAPSHOT
Ms. Clifford Fernando   MRN: [id number]    Department:  [facility] Emergency Department in [place]   Date of Visit:  1/3/2018           Disclosure     Insurance plans vary and the physician(s) referred by the ER may not be covered by your plan.  Please co tell this physician (or your personal doctor if your instructions are to return to your personal doctor) about any new or lasting problems. The primary care or specialist physician will see patients referred from the [facility] Emergency Department.  Lamonte Ha
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
## (undated) NOTE — LETTER
Date: 2/17/2020    Patient Name: Priya Todd          To Whom it may concern:    Adrien Carrillo should be excused from working  position through 2/25/2020 due to recent axillary surgery with associated complications.   She may work on the floor or a

## (undated) NOTE — LETTER
Date: 12/2/2022    Patient Name: Bud Dubon          To Whom it may concern: This letter has been written at the patient's request. The above patient was seen at the [facility] for treatment of a medical condition. This patient should be excused from attending school today due to illness. She was evaluated in our clinic and is under our care.         Sincerely,      aMtthew Cobian PA-C

## (undated) NOTE — LETTER
Date: 3/17/2022    Patient Name: Chantale Florez          To Whom it may concern: This letter has been written at the patient's request. The above patient was seen at the [facility] for treatment of a medical condition. Please note this patient is being followed for chronic and recently worsening migraines and headaches. These headaches on occasion are severe enough that patient is unable to attend school.         Sincerely,        [id number], DO

## (undated) NOTE — LETTER
Date: 12/2/2022    Patient Name: Roque Gutierrez          To Whom it may concern: This letter has been written at the patient's request. The above patient was seen at the [facility] for treatment of a medical condition. This patient should be excused from attending work 12/3/22 due to illness. She was seen in our clinic and is under our care.         Sincerely,      Mikayla Farrell PA-C